# Patient Record
Sex: FEMALE | Race: WHITE | Employment: OTHER | URBAN - METROPOLITAN AREA
[De-identification: names, ages, dates, MRNs, and addresses within clinical notes are randomized per-mention and may not be internally consistent; named-entity substitution may affect disease eponyms.]

---

## 2019-11-15 RX ORDER — ALBUTEROL SULFATE 0.83 MG/ML
2.5 SOLUTION RESPIRATORY (INHALATION) EVERY 6 HOURS PRN
Qty: 1 EACH | Refills: 2 | Status: SHIPPED | OUTPATIENT
Start: 2019-11-15 | End: 2020-01-07

## 2019-11-15 NOTE — TELEPHONE ENCOUNTER
----- Message from Karon Costello sent at 11/15/2019  9:28 AM CST -----  Regarding: Refill  Contact: [phone]  Good Morning,      Requesting a return call back from Nurse regarding her mother in law's medicated inhaler, PROAIR ( Inhaler). Please contact caller at the number listed above.    Thank You,  [employer]

## 2020-01-07 ENCOUNTER — OFFICE VISIT (OUTPATIENT)
Dept: FAMILY MEDICINE | Facility: CLINIC | Age: 85
End: 2020-01-07
Payer: MEDICARE

## 2020-01-07 VITALS
WEIGHT: 166.69 LBS | BODY MASS INDEX: 28.46 KG/M2 | TEMPERATURE: 98 F | HEART RATE: 64 BPM | OXYGEN SATURATION: 97 % | DIASTOLIC BLOOD PRESSURE: 74 MMHG | HEIGHT: 64 IN | SYSTOLIC BLOOD PRESSURE: 122 MMHG

## 2020-01-07 DIAGNOSIS — M85.80 OSTEOPENIA, UNSPECIFIED LOCATION: Chronic | ICD-10-CM

## 2020-01-07 DIAGNOSIS — J44.9 MILD CHRONIC OBSTRUCTIVE PULMONARY DISEASE: ICD-10-CM

## 2020-01-07 DIAGNOSIS — I10 ESSENTIAL HYPERTENSION: ICD-10-CM

## 2020-01-07 DIAGNOSIS — E03.9 ACQUIRED HYPOTHYROIDISM: ICD-10-CM

## 2020-01-07 DIAGNOSIS — E78.2 MIXED HYPERLIPIDEMIA: ICD-10-CM

## 2020-01-07 DIAGNOSIS — N32.81 OVERACTIVE BLADDER: ICD-10-CM

## 2020-01-07 PROBLEM — M81.0 SENILE OSTEOPOROSIS: Status: RESOLVED | Noted: 2020-01-07 | Resolved: 2020-01-07

## 2020-01-07 PROBLEM — E78.00 HYPERCHOLESTEROLEMIA: Status: ACTIVE | Noted: 2020-01-07

## 2020-01-07 PROBLEM — M81.0 SENILE OSTEOPOROSIS: Status: ACTIVE | Noted: 2020-01-07

## 2020-01-07 PROCEDURE — 99214 OFFICE O/P EST MOD 30 MIN: CPT | Mod: S$PBB,,, | Performed by: INTERNAL MEDICINE

## 2020-01-07 PROCEDURE — 99213 OFFICE O/P EST LOW 20 MIN: CPT | Mod: PBBFAC,PN,25 | Performed by: INTERNAL MEDICINE

## 2020-01-07 PROCEDURE — 1126F AMNT PAIN NOTED NONE PRSNT: CPT | Mod: ,,, | Performed by: INTERNAL MEDICINE

## 2020-01-07 PROCEDURE — 1126F PR PAIN SEVERITY QUANTIFIED, NO PAIN PRESENT: ICD-10-PCS | Mod: ,,, | Performed by: INTERNAL MEDICINE

## 2020-01-07 PROCEDURE — G0009 ADMIN PNEUMOCOCCAL VACCINE: HCPCS | Mod: PBBFAC,PN

## 2020-01-07 PROCEDURE — 99214 PR OFFICE/OUTPT VISIT, EST, LEVL IV, 30-39 MIN: ICD-10-PCS | Mod: S$PBB,,, | Performed by: INTERNAL MEDICINE

## 2020-01-07 PROCEDURE — 99999 PR PBB SHADOW E&M-EST. PATIENT-LVL III: ICD-10-PCS | Mod: PBBFAC,,, | Performed by: INTERNAL MEDICINE

## 2020-01-07 PROCEDURE — 1159F MED LIST DOCD IN RCRD: CPT | Mod: ,,, | Performed by: INTERNAL MEDICINE

## 2020-01-07 PROCEDURE — 99999 PR PBB SHADOW E&M-EST. PATIENT-LVL III: CPT | Mod: PBBFAC,,, | Performed by: INTERNAL MEDICINE

## 2020-01-07 PROCEDURE — 1159F PR MEDICATION LIST DOCUMENTED IN MEDICAL RECORD: ICD-10-PCS | Mod: ,,, | Performed by: INTERNAL MEDICINE

## 2020-01-07 RX ORDER — HYDROCHLOROTHIAZIDE 25 MG/1
81 TABLET ORAL DAILY
Refills: 3 | COMMUNITY
Start: 2019-10-21 | End: 2020-07-10 | Stop reason: SDUPTHER

## 2020-01-07 RX ORDER — ALBUTEROL SULFATE 90 UG/1
2 AEROSOL, METERED RESPIRATORY (INHALATION) EVERY 6 HOURS PRN
Qty: 18 G | Refills: 5 | Status: SHIPPED | OUTPATIENT
Start: 2020-01-07 | End: 2021-07-09 | Stop reason: SDUPTHER

## 2020-01-07 RX ORDER — EPINEPHRINE 0.22MG
100 AEROSOL WITH ADAPTER (ML) INHALATION DAILY
COMMUNITY
Start: 2013-03-30

## 2020-01-07 RX ORDER — MULTIVIT WITH IRON,MINERALS
100 TABLET ORAL DAILY
COMMUNITY
Start: 2013-03-30 | End: 2020-07-10

## 2020-01-07 RX ORDER — OXYBUTYNIN CHLORIDE 5 MG/1
5 TABLET, EXTENDED RELEASE ORAL DAILY
COMMUNITY
Start: 2019-12-05 | End: 2020-05-29 | Stop reason: SDUPTHER

## 2020-01-07 RX ORDER — LEVOTHYROXINE SODIUM 112 UG/1
112 TABLET ORAL DAILY
COMMUNITY
Start: 2019-12-23 | End: 2020-06-22 | Stop reason: SDUPTHER

## 2020-01-07 NOTE — ASSESSMENT & PLAN NOTE
Stable with albuterol inhaler and nebs PRN.  Does well.  No SOB/wheezing.  Uses inhaler in AM usually, occasionally in the afternoon but not every day.

## 2020-01-07 NOTE — ASSESSMENT & PLAN NOTE
BP controlled on HCTZ 25 mg daily, no SOB/CP/HA.  Goes to wellness center 3 days per weeks.  Has occasional swelling in feet.

## 2020-01-07 NOTE — PROGRESS NOTES
[facility] Note    Chief Complaint      Chief Complaint   Patient presents with    Establish Care     f/u from       History of Present Illness      Pradip Zelaya is a 89 y.o. female who presents today to establish care for HTN.  Patient comes to appointment alone.     Problem List Items Addressed This Visit     Hypothyroidism    Current Assessment & Plan     Stable on synthroid 112 mcg daily, no S/S of hypo/hyperthyroidism.         Relevant Orders    TSH    T4, free    Mild chronic obstructive pulmonary disease    Current Assessment & Plan     Stable with albuterol inhaler and nebs PRN.  Does well.  No SOB/wheezing.  Uses inhaler in AM usually, occasionally in the afternoon but not every day.         Relevant Medications    albuterol (PROAIR HFA) 90 mcg/actuation inhaler    Other Relevant Orders    Pneumococcal Polysaccharide Vaccine (23 Valent) (SQ/IM)    Mixed hyperlipidemia    Current Assessment & Plan     Not on Rx meds, takes niacin.           Relevant Orders    Lipid panel    Overactive bladder    Current Assessment & Plan     Stable on ditropan 5 mg daily.  Works well for her.         Essential hypertension    Current Assessment & Plan     BP controlled on HCTZ 25 mg daily, no SOB/CP/HA.  Goes to wellness center 3 days per weeks.  Has occasional swelling in feet.         Relevant Orders    CBC auto differential    Comprehensive metabolic panel    RESOLVED: Senile osteoporosis    Current Assessment & Plan     On 7/2019 DEXA at . Exercises several times.               Health Maintenance   Topic Date Due    Lipid Panel  05/10/1927    TETANUS VACCINE  05/10/1945    Pneumococcal Vaccine (65+ Low/Medium Risk) (1 of 2 - PCV13) 05/10/1992       Past Medical History:   Diagnosis Date    Senile osteoporosis 1/7/2020       Past Surgical History:   Procedure Laterality Date    EYE SURGERY      TONSILLECTOMY         family history includes Cancer in her son; Hypertension in her  father; No Known Problems in her mother.    Social History     Tobacco Use    Smoking status: Never Smoker   Substance Use Topics    Alcohol use: Not Currently    Drug use: Never       Review of Systems   Constitutional: Negative for chills and fever.   HENT: Negative for congestion and sore throat.    Eyes: Negative for blurred vision and discharge.   Respiratory: Negative for cough and shortness of breath.    Cardiovascular: Negative for chest pain and palpitations.   Gastrointestinal: Negative for constipation, diarrhea, nausea and vomiting.   Genitourinary: Negative for dysuria and hematuria.   Musculoskeletal: Negative for falls and myalgias.   Skin: Negative for itching and rash.   Neurological: Negative for dizziness and headaches.        Outpatient Encounter Medications as of 1/7/2020   Medication Sig Dispense Refill    aspirin-calcium carbonate 81 mg-300 mg calcium(777 mg) Tab Take 81 mg by mouth once daily.      coenzyme Q10 100 mg capsule Take 100 mg by mouth once daily.      hydroCHLOROthiazide (HYDRODIURIL) 25 MG tablet Take 81 mg by mouth once daily.  3    levothyroxine (SYNTHROID) 112 MCG tablet Take 112 mcg by mouth once daily.      niacin 100 MG Tab Take 100 mg by mouth once daily.      oxybutynin (DITROPAN-XL) 5 MG TR24 Take 5 mg by mouth once daily.      [DISCONTINUED] albuterol (PROVENTIL) 2.5 mg /3 mL (0.083 %) nebulizer solution Take 3 mLs (2.5 mg total) by nebulization every 6 (six) hours as needed for Wheezing. Rescue 1 each 2    albuterol (PROAIR HFA) 90 mcg/actuation inhaler Inhale 2 puffs into the lungs every 6 (six) hours as needed for Wheezing. Rescue 18 g 5     No facility-administered encounter medications on file as of 1/7/2020.         Review of patient's allergies indicates:   Allergen Reactions    Bactrim [sulfamethoxazole-trimethoprim]     Ciprofloxacin        Physical Exam      Vital Signs  Temp: 97.7 °F (36.5 °C)  Temp src: Oral  Pulse: 64  SpO2: 97 %  BP:  "122/74  BP Location: Left arm  Patient Position: Sitting  Pain Score: 0-No pain  Height and Weight  Height: 5' 4" (162.6 cm)  Weight: 75.6 kg (166 lb 10.7 oz)  BSA (Calculated - sq m): 1.85 sq meters  BMI (Calculated): 28.6  Weight in (lb) to have BMI = 25: 145.3]    Physical Exam   Constitutional: She is oriented to person, place, and time. She appears well-developed and well-nourished.   HENT:   Head: Normocephalic and atraumatic.   Right Ear: External ear normal.   Left Ear: External ear normal.   Eyes: Right eye exhibits no discharge. Left eye exhibits no discharge.   Neck: Normal range of motion. No thyromegaly present.   Cardiovascular: Normal rate, regular rhythm, normal heart sounds and intact distal pulses.   No murmur heard.  Pulmonary/Chest: Effort normal and breath sounds normal. No respiratory distress.   Abdominal: Soft. Bowel sounds are normal. She exhibits no distension. There is no tenderness.   Musculoskeletal: Normal range of motion. She exhibits no deformity.   Neurological: She is alert and oriented to person, place, and time.   Skin: Skin is warm and dry. No rash noted.   Psychiatric: She has a normal mood and affect. Her behavior is normal.        Laboratory:  CBC:  No results for input(s): WBC, RBC, HGB, HCT, PLT, MCV, MCH, MCHC in the last 2160 hours.  CMP:  No results for input(s): GLU, CALCIUM, ALBUMIN, PROT, NA, K, CO2, CL, BUN, ALKPHOS, ALT, AST, BILITOT in the last 2160 hours.    Invalid input(s): CREATININ  URINALYSIS:  No results for input(s): COLORU, CLARITYU, SPECGRAV, PHUR, PROTEINUA, GLUCOSEU, BILIRUBINCON, BLOODU, WBCU, RBCU, BACTERIA, MUCUS, NITRITE, LEUKOCYTESUR, UROBILINOGEN, HYALINECASTS in the last 2160 hours.   LIPIDS:  No results for input(s): TSH, HDL, CHOL, TRIG, LDLCALC, CHOLHDL, NONHDLCHOL, TOTALCHOLEST in the last 2160 hours.  TSH:  No results for input(s): TSH in the last 2160 hours.  A1C:  No results for input(s): HGBA1C in the last 2160 hours.    Radiology:  No " imaging on file    Assessment/Plan     Pradip Zelaya is a 89 y.o.female with:    1. Mild chronic obstructive pulmonary disease  - albuterol (PROAIR HFA) 90 mcg/actuation inhaler; Inhale 2 puffs into the lungs every 6 (six) hours as needed for Wheezing. Rescue  Dispense: 18 g; Refill: 5  - Pneumococcal Polysaccharide Vaccine (23 Valent) (SQ/IM)    2. Mixed hyperlipidemia  - Lipid panel; Future  - Lipid panel    3. Overactive bladder    4. Acquired hypothyroidism  - TSH; Future  - T4, free; Future  - TSH  - T4, free    5. Essential hypertension  - CBC auto differential; Future  - Comprehensive metabolic panel; Future  - CBC auto differential  - Comprehensive metabolic panel    6. Osteopenia, unspecified location    -pneumovax today  -labs sent to [facility] per patient request  -Continue current medications and maintain follow up with specialists.  Return to clinic in 6 months.      Herminia Patiño MD  [facility] [place]

## 2020-01-10 LAB
ALBUMIN: 4.6 GRAM/DL (ref 3.5–5)
ALP SERPL-CCNC: 73 UNIT/L (ref 38–126)
ALT SERPL W P-5'-P-CCNC: 20 UNIT/L (ref 7–56)
ANION GAP SERPL CALC-SCNC: 19 MEQ/L (ref 9–18)
AST SERPL-CCNC: 26 UNIT/L (ref 7–40)
BASOPHILS ABSOLUTE COUNT: 0.1 K/UL (ref 0–0.2)
BASOPHILS NFR BLD: 1.2 % (ref 0–2)
BILIRUB SERPL-MCNC: 0.7 MG/DL (ref 0–1.2)
BUN BLD-MCNC: 21 MG/DL (ref 7–21)
BUN/CREAT SERPL: 23 RATIO (ref 6–22)
CALC OSMOLALITY: 288 MOSM/KG (ref 275–295)
CALCIUM SERPL-MCNC: 9.5 MG/DL (ref 8.5–10.3)
CHLORIDE SERPL-SCNC: 98 MEQ/L (ref 98–107)
CHOL/HDLC RATIO: 4
CHOLEST SERPL-MSCNC: 236 MG/DL (ref 100–200)
CO2 SERPL-SCNC: 29 MEQ/L (ref 21–31)
CREAT SERPL-MCNC: 0.9 MG/DL (ref 0.5–1)
DIFFERENTIAL TYPE: NORMAL
EOSINOPHIL NFR BLD: 2.4 % (ref 0–4)
EOSINOPHILS ABSOLUTE COUNT: 0.2 K/UL (ref 0–0.7)
ERYTHROCYTE [DISTWIDTH] IN BLOOD BY AUTOMATED COUNT: 13.9 GRAM/DL (ref 12–15.3)
FREE T4: 1.4 NANOGRAM/DL (ref 0.9–1.7)
GFR: 60.9 ML/MIN/1.73M2
GLUCOSE SERPL-MCNC: 130 MG/DL (ref 70–100)
HCT VFR BLD AUTO: 43.6 % (ref 37–47)
HDLC SERPL-MCNC: 64 MG/DL (ref 40–75)
HGB BLD-MCNC: 14.5 GRAM/DL (ref 12–16)
LDLC SERPL CALC-MCNC: 149 MG/DL (ref 0–125)
LYMPHOCYTES %: 40.2 % (ref 15–45)
LYMPHOCYTES ABSOLUTE COUNT: 2.5 K/UL (ref 1–4.2)
MCH RBC QN AUTO: 30.4 PICOGRAM (ref 27–33)
MCHC RBC AUTO-ENTMCNC: 33.3 GRAM/DL (ref 32–36)
MCV RBC AUTO: 91.4 FEMTOLITER (ref 81–99)
MONOCYTES %: 7.6 % (ref 3–13)
MONOCYTES ABSOLUTE COUNT: 0.5 K/UL (ref 0.1–0.8)
NEUTROPHILS ABSOLUTE COUNT: 3.1 K/UL (ref 2.1–7.6)
NEUTROPHILS RELATIVE PERCENT: 48.6 % (ref 32–80)
NONHDLC SERPL-MCNC: 172 MG/DL (ref 60–125)
PLATELET # BLD AUTO: 209 K/UL (ref 150–350)
PMV BLD AUTO: 8.3 FEMTOLITER (ref 7–10.2)
POTASSIUM SERPL-SCNC: 4 MEQ/L (ref 3.5–5)
RBC # BLD AUTO: 4.77 MIL/UL (ref 4.2–5.4)
SODIUM BLD-SCNC: 142 MEQ/L (ref 135–145)
TOTAL PROTEIN: 6.6 GRAM/DL (ref 6.3–8.2)
TRIGL SERPL-MCNC: 144 MG/DL (ref 30–150)
TSH SERPL DL<=0.005 MIU/L-ACNC: 3.06 UIL/ML (ref 0.35–4)
WBC # BLD AUTO: 6.3 K/UL (ref 4.5–11)

## 2020-01-13 NOTE — PROGRESS NOTES
Please let patient know that their cholesterol is high.  Given lack of other risk factors for stroke/heart attack, no need for medication at this time.  Patient just needs to work on low fat/healthy diet.    Fasting glucose is elevated, diet/exercise will also help with this.

## 2020-03-20 ENCOUNTER — PATIENT MESSAGE (OUTPATIENT)
Dept: FAMILY MEDICINE | Facility: CLINIC | Age: 85
End: 2020-03-20

## 2020-03-20 RX ORDER — METRONIDAZOLE 500 MG/1
500 TABLET ORAL 3 TIMES DAILY
Qty: 21 TABLET | Refills: 0 | Status: SHIPPED | OUTPATIENT
Start: 2020-03-20 | End: 2020-05-01 | Stop reason: SDUPTHER

## 2020-05-01 ENCOUNTER — PATIENT MESSAGE (OUTPATIENT)
Dept: FAMILY MEDICINE | Facility: CLINIC | Age: 85
End: 2020-05-01

## 2020-05-01 RX ORDER — METRONIDAZOLE 500 MG/1
500 TABLET ORAL 3 TIMES DAILY
Qty: 21 TABLET | Refills: 0 | Status: SHIPPED | OUTPATIENT
Start: 2020-05-01 | End: 2020-07-10

## 2020-05-29 ENCOUNTER — PATIENT MESSAGE (OUTPATIENT)
Dept: FAMILY MEDICINE | Facility: CLINIC | Age: 85
End: 2020-05-29

## 2020-05-29 RX ORDER — OXYBUTYNIN CHLORIDE 5 MG/1
5 TABLET, EXTENDED RELEASE ORAL DAILY
Qty: 90 TABLET | Refills: 3 | Status: SHIPPED | OUTPATIENT
Start: 2020-05-29 | End: 2020-07-10 | Stop reason: SDUPTHER

## 2020-07-10 ENCOUNTER — OFFICE VISIT (OUTPATIENT)
Dept: FAMILY MEDICINE | Facility: CLINIC | Age: 85
End: 2020-07-10
Payer: MEDICARE

## 2020-07-10 VITALS
OXYGEN SATURATION: 96 % | DIASTOLIC BLOOD PRESSURE: 82 MMHG | TEMPERATURE: 98 F | HEART RATE: 55 BPM | SYSTOLIC BLOOD PRESSURE: 124 MMHG | BODY MASS INDEX: 27.36 KG/M2 | WEIGHT: 159.38 LBS

## 2020-07-10 DIAGNOSIS — R73.01 IMPAIRED FASTING GLUCOSE: ICD-10-CM

## 2020-07-10 DIAGNOSIS — E78.2 MIXED HYPERLIPIDEMIA: ICD-10-CM

## 2020-07-10 DIAGNOSIS — E03.9 ACQUIRED HYPOTHYROIDISM: Primary | ICD-10-CM

## 2020-07-10 DIAGNOSIS — J44.9 MILD CHRONIC OBSTRUCTIVE PULMONARY DISEASE: ICD-10-CM

## 2020-07-10 DIAGNOSIS — M85.80 OSTEOPENIA, UNSPECIFIED LOCATION: ICD-10-CM

## 2020-07-10 DIAGNOSIS — I10 ESSENTIAL HYPERTENSION: ICD-10-CM

## 2020-07-10 DIAGNOSIS — N32.81 OVERACTIVE BLADDER: ICD-10-CM

## 2020-07-10 PROCEDURE — 99213 OFFICE O/P EST LOW 20 MIN: CPT | Mod: PBBFAC,PN | Performed by: INTERNAL MEDICINE

## 2020-07-10 PROCEDURE — 99999 PR PBB SHADOW E&M-EST. PATIENT-LVL III: CPT | Mod: PBBFAC,,, | Performed by: INTERNAL MEDICINE

## 2020-07-10 PROCEDURE — 99214 PR OFFICE/OUTPT VISIT, EST, LEVL IV, 30-39 MIN: ICD-10-PCS | Mod: S$PBB,,, | Performed by: INTERNAL MEDICINE

## 2020-07-10 PROCEDURE — 99999 PR PBB SHADOW E&M-EST. PATIENT-LVL III: ICD-10-PCS | Mod: PBBFAC,,, | Performed by: INTERNAL MEDICINE

## 2020-07-10 PROCEDURE — 99214 OFFICE O/P EST MOD 30 MIN: CPT | Mod: S$PBB,,, | Performed by: INTERNAL MEDICINE

## 2020-07-10 RX ORDER — GLUCOSAMINE/CHONDRO SU A 500-400 MG
1 TABLET ORAL DAILY
COMMUNITY

## 2020-07-10 RX ORDER — OXYBUTYNIN CHLORIDE 5 MG/1
5 TABLET, EXTENDED RELEASE ORAL DAILY
Qty: 90 TABLET | Refills: 3 | Status: SHIPPED | OUTPATIENT
Start: 2020-07-10 | End: 2020-07-10

## 2020-07-10 RX ORDER — FLUTICASONE FUROATE AND VILANTEROL TRIFENATATE 100; 25 UG/1; UG/1
1 POWDER RESPIRATORY (INHALATION) DAILY
Qty: 60 EACH | Refills: 5 | Status: SHIPPED | OUTPATIENT
Start: 2020-07-10 | End: 2021-01-08 | Stop reason: SDUPTHER

## 2020-07-10 RX ORDER — NIACIN 500 MG
250 CAPSULE, EXTENDED RELEASE ORAL NIGHTLY
COMMUNITY

## 2020-07-10 RX ORDER — LEVOTHYROXINE SODIUM 112 UG/1
112 TABLET ORAL DAILY
Qty: 90 TABLET | Refills: 3 | Status: SHIPPED | OUTPATIENT
Start: 2020-07-10 | End: 2021-01-11

## 2020-07-10 RX ORDER — HYDROCHLOROTHIAZIDE 25 MG/1
25 TABLET ORAL DAILY
Qty: 90 TABLET | Refills: 3 | Status: SHIPPED | OUTPATIENT
Start: 2020-07-10 | End: 2020-07-10

## 2020-07-10 RX ORDER — FLUTICASONE FUROATE AND VILANTEROL TRIFENATATE 100; 25 UG/1; UG/1
1 POWDER RESPIRATORY (INHALATION) DAILY
COMMUNITY
End: 2020-07-10 | Stop reason: SDUPTHER

## 2020-07-10 RX ORDER — LEVOTHYROXINE SODIUM 112 UG/1
112 TABLET ORAL DAILY
Qty: 90 TABLET | Refills: 3 | Status: SHIPPED | OUTPATIENT
Start: 2020-07-10 | End: 2020-07-10

## 2020-07-10 RX ORDER — ALENDRONATE SODIUM 70 MG/1
70 TABLET ORAL
Qty: 12 TABLET | Refills: 3 | Status: SHIPPED | OUTPATIENT
Start: 2020-07-10 | End: 2020-07-10

## 2020-07-10 RX ORDER — ASCORBIC ACID 500 MG
500 TABLET ORAL DAILY
COMMUNITY

## 2020-07-10 RX ORDER — ALENDRONATE SODIUM 70 MG/1
70 TABLET ORAL
Qty: 12 TABLET | Refills: 3 | Status: SHIPPED | OUTPATIENT
Start: 2020-07-10 | End: 2021-01-08

## 2020-07-10 RX ORDER — OXYBUTYNIN CHLORIDE 5 MG/1
5 TABLET, EXTENDED RELEASE ORAL DAILY
Qty: 90 TABLET | Refills: 3 | Status: SHIPPED | OUTPATIENT
Start: 2020-07-10 | End: 2020-08-26 | Stop reason: SDUPTHER

## 2020-07-10 RX ORDER — ALENDRONATE SODIUM 70 MG/1
70 TABLET ORAL
COMMUNITY
End: 2020-07-10 | Stop reason: SDUPTHER

## 2020-07-10 RX ORDER — HYDROCHLOROTHIAZIDE 25 MG/1
25 TABLET ORAL DAILY
Qty: 90 TABLET | Refills: 3 | Status: SHIPPED | OUTPATIENT
Start: 2020-07-10 | End: 2021-06-10 | Stop reason: SDUPTHER

## 2020-07-10 NOTE — PROGRESS NOTES
[facility] Note    Chief Complaint      Chief Complaint   Patient presents with    Follow-up     History of Present Illness      Pradip Zelaya is a 89 y.o. female who presents today for follow up of HTN.  Patient comes to appointment alone.     Problem List Items Addressed This Visit     Hypothyroidism - Primary    Current Assessment & Plan     Stable on synthroid 112 mcg daily, no S/S of hypo/hyperthyroidism.         Relevant Medications    levothyroxine (SYNTHROID) 112 MCG tablet    Other Relevant Orders    TSH    T4, free    Mild chronic obstructive pulmonary disease    Current Assessment & Plan     Stable with albuterol inhaler and nebs PRN.  Does well.  No SOB/wheezing.  Uses inhaler in AM usually, occasionally in the afternoon but not every day.         Relevant Medications    fluticasone furoate-vilanteroL (BREO ELLIPTA) 100-25 mcg/dose diskus inhaler    Mixed hyperlipidemia    Current Assessment & Plan     Not on Rx meds, takes niacin.           Relevant Orders    Lipid Panel    Overactive bladder    Relevant Medications    oxybutynin (DITROPAN-XL) 5 MG TR24    Essential hypertension    Current Assessment & Plan     BP controlled on HCTZ 25 mg daily, no SOB/CP/HA.  Goes to wellness center 3 days per weeks.  Has occasional swelling in feet.         Relevant Medications    hydroCHLOROthiazide (HYDRODIURIL) 25 MG tablet      Other Visit Diagnoses     Impaired fasting glucose        Relevant Orders    Hemoglobin A1C    Osteopenia, unspecified location        Relevant Medications    alendronate (FOSAMAX) 70 MG tablet          Health Maintenance   Topic Date Due    TETANUS VACCINE  05/10/1945    Lipid Panel  01/10/2025    Pneumococcal Vaccine (65+ Low/Medium Risk)  Completed       Past Medical History:   Diagnosis Date    Senile osteoporosis 1/7/2020       Past Surgical History:   Procedure Laterality Date    ADENOIDECTOMY  1934    EYE SURGERY      TONSILLECTOMY         family  history includes Cancer in her son; Heart disease in her mother; Hypertension in her father; Stroke in her father.    Social History     Tobacco Use    Smoking status: Former Smoker     Packs/day: 0.25     Years: 10.00     Pack years: 2.50     Types: Cigarettes     Start date: 1943     Quit date: 1953     Years since quittin.2    Tobacco comment: Extremely light usage.   Substance Use Topics    Alcohol use: Not Currently     Alcohol/week: 0.0 standard drinks     Frequency: Monthly or less     Drinks per session: Patient refused     Binge frequency: Never    Drug use: Never       Review of Systems   Constitutional: Negative for chills and fever.   HENT: Positive for hearing loss. Negative for congestion and sore throat.    Eyes: Negative for blurred vision and discharge.   Respiratory: Negative for cough, shortness of breath and wheezing.    Cardiovascular: Negative for chest pain and palpitations.   Gastrointestinal: Negative for constipation, diarrhea, nausea and vomiting.   Genitourinary: Negative for dysuria and hematuria.   Musculoskeletal: Negative for falls, myalgias and neck pain.   Skin: Negative for itching and rash.   Neurological: Negative for dizziness and headaches.        Outpatient Encounter Medications as of 7/10/2020   Medication Sig Dispense Refill    albuterol (PROAIR HFA) 90 mcg/actuation inhaler Inhale 2 puffs into the lungs every 6 (six) hours as needed for Wheezing. Rescue 18 g 5    alendronate (FOSAMAX) 70 MG tablet Take 1 tablet (70 mg total) by mouth every 7 days. 12 tablet 3    ascorbic acid, vitamin C, (VITAMIN C) 500 MG tablet Take 500 mg by mouth once daily.      aspirin-calcium carbonate 81 mg-300 mg calcium(777 mg) Tab Take 81 mg by mouth once daily.      coenzyme Q10 100 mg capsule Take 100 mg by mouth once daily.      fluticasone furoate-vilanteroL (BREO ELLIPTA) 100-25 mcg/dose diskus inhaler Inhale 1 puff into the lungs once daily. Controller 60 each 5     folic acid/multivit-min/lutein (CENTRUM SILVER ORAL) Take 1 capsule by mouth once daily.      glucosamine-chondroitin 500-400 mg tablet Take 1 tablet by mouth once daily.      hydroCHLOROthiazide (HYDRODIURIL) 25 MG tablet Take 1 tablet (25 mg total) by mouth once daily. 90 tablet 3    levothyroxine (SYNTHROID) 112 MCG tablet Take 1 tablet (112 mcg total) by mouth once daily. 90 tablet 3    niacin 500 MG CpSR Take 250 mg by mouth every evening.      oxybutynin (DITROPAN-XL) 5 MG TR24 Take 1 tablet (5 mg total) by mouth once daily. 90 tablet 3    [DISCONTINUED] alendronate (FOSAMAX) 70 MG tablet Take 70 mg by mouth every 7 days.      [DISCONTINUED] alendronate (FOSAMAX) 70 MG tablet Take 1 tablet (70 mg total) by mouth every 7 days. 12 tablet 3    [DISCONTINUED] fluticasone furoate-vilanteroL (BREO ELLIPTA) 100-25 mcg/dose diskus inhaler Inhale 1 puff into the lungs once daily. Controller      [DISCONTINUED] hydroCHLOROthiazide (HYDRODIURIL) 25 MG tablet Take 81 mg by mouth once daily.  3    [DISCONTINUED] hydroCHLOROthiazide (HYDRODIURIL) 25 MG tablet Take 1 tablet (25 mg total) by mouth once daily. 90 tablet 3    [DISCONTINUED] levothyroxine (SYNTHROID) 112 MCG tablet Take 1 tablet (112 mcg total) by mouth once daily. 30 tablet 5    [DISCONTINUED] levothyroxine (SYNTHROID) 112 MCG tablet Take 1 tablet (112 mcg total) by mouth once daily. 90 tablet 3    [DISCONTINUED] oxybutynin (DITROPAN-XL) 5 MG TR24 Take 1 tablet (5 mg total) by mouth once daily. 90 tablet 3    [DISCONTINUED] oxybutynin (DITROPAN-XL) 5 MG TR24 Take 1 tablet (5 mg total) by mouth once daily. 90 tablet 3    [DISCONTINUED] metroNIDAZOLE (FLAGYL) 500 MG tablet Take 1 tablet (500 mg total) by mouth 3 (three) times daily. (Patient not taking: Reported on 7/10/2020) 21 tablet 0    [DISCONTINUED] niacin 100 MG Tab Take 100 mg by mouth once daily.       No facility-administered encounter medications on file as of 7/10/2020.         Review  of patient's allergies indicates:   Allergen Reactions    Bactrim [sulfamethoxazole-trimethoprim]     Ciprofloxacin        Physical Exam      Vital Signs  Temp: 97.7 °F (36.5 °C)  Temp src: Oral  Pulse: (!) 55  SpO2: 96 %  BP: 124/82  BP Location: Left arm  Patient Position: Sitting  Pain Score: 0-No pain  Height and Weight  Weight: 72.3 kg (159 lb 6.3 oz)]  Body mass index is 27.36 kg/m².    Physical Exam  Constitutional:       Appearance: She is well-developed.   HENT:      Head: Normocephalic and atraumatic.      Right Ear: External ear normal.      Left Ear: External ear normal.   Eyes:      General:         Right eye: No discharge.         Left eye: No discharge.   Neck:      Musculoskeletal: Normal range of motion.      Thyroid: No thyromegaly.   Cardiovascular:      Rate and Rhythm: Normal rate and regular rhythm.      Heart sounds: Normal heart sounds. No murmur.   Pulmonary:      Effort: Pulmonary effort is normal. No respiratory distress.      Breath sounds: Normal breath sounds.   Abdominal:      General: Bowel sounds are normal. There is no distension.      Palpations: Abdomen is soft.      Tenderness: There is no abdominal tenderness.   Musculoskeletal: Normal range of motion.         General: No deformity.   Skin:     General: Skin is warm and dry.      Findings: No rash.   Neurological:      Mental Status: She is alert and oriented to person, place, and time.   Psychiatric:         Behavior: Behavior normal.          Laboratory:  CBC:  No results for input(s): WBC, RBC, HGB, HCT, PLT, MCV, MCH, MCHC in the last 2160 hours.  CMP:  No results for input(s): GLU, CALCIUM, ALBUMIN, PROT, NA, K, CO2, CL, BUN, ALKPHOS, ALT, AST, BILITOT in the last 2160 hours.    Invalid input(s): CREATININ  URINALYSIS:  No results for input(s): COLORU, CLARITYU, SPECGRAV, PHUR, PROTEINUA, GLUCOSEU, BILIRUBINCON, BLOODU, WBCU, RBCU, BACTERIA, MUCUS, NITRITE, LEUKOCYTESUR, UROBILINOGEN, HYALINECASTS in the last 2160 hours.    LIPIDS:  No results for input(s): TSH, HDL, CHOL, TRIG, LDLCALC, CHOLHDL, NONHDLCHOL, TOTALCHOLEST in the last 2160 hours.  TSH:  No results for input(s): TSH in the last 2160 hours.  A1C:  No results for input(s): HGBA1C in the last 2160 hours.    Radiology:  No imaging on file    Assessment/Plan     Pradip Zelaya is a 89 y.o.female with:    1. Acquired hypothyroidism  - TSH; Future  - T4, free; Future  - TSH  - T4, free  - levothyroxine (SYNTHROID) 112 MCG tablet; Take 1 tablet (112 mcg total) by mouth once daily.  Dispense: 90 tablet; Refill: 3    2. Mixed hyperlipidemia  - Lipid Panel; Future  - Lipid Panel    3. Essential hypertension  - hydroCHLOROthiazide (HYDRODIURIL) 25 MG tablet; Take 1 tablet (25 mg total) by mouth once daily.  Dispense: 90 tablet; Refill: 3    4. Mild chronic obstructive pulmonary disease  - fluticasone furoate-vilanteroL (BREO ELLIPTA) 100-25 mcg/dose diskus inhaler; Inhale 1 puff into the lungs once daily. Controller  Dispense: 60 each; Refill: 5    5. Impaired fasting glucose  - Hemoglobin A1C; Future  - Hemoglobin A1C    6. Osteopenia, unspecified location  - alendronate (FOSAMAX) 70 MG tablet; Take 1 tablet (70 mg total) by mouth every 7 days.  Dispense: 12 tablet; Refill: 3    7. Overactive bladder  - oxybutynin (DITROPAN-XL) 5 MG TR24; Take 1 tablet (5 mg total) by mouth once daily.  Dispense: 90 tablet; Refill: 3    -labs sent to [facility] per patient request  -Continue current medications and maintain follow up with specialists.  Return to clinic in 6 months.      Herminia Patiño MD  [facility] [place]      Answers for HPI/ROS submitted by the patient on 7/7/2020   activity change: No  unexpected weight change: No  trouble swallowing: No  chest tightness: No  difficulty urinating: No  dysphoric mood: No

## 2020-07-14 LAB
CHOLEST SERPL-MCNC: 232 MG/DL
CHOLEST/HDLC SERPL: 3.9 (CALC)
HBA1C MFR BLD: 6.4 % OF TOTAL HGB
HDLC SERPL-MCNC: 60 MG/DL
LDLC SERPL CALC-MCNC: 143 MG/DL (CALC)
NONHDLC SERPL-MCNC: 172 MG/DL (CALC)
T4 FREE SERPL-MCNC: 1.4 NG/DL (ref 0.8–1.8)
TRIGL SERPL-MCNC: 157 MG/DL
TSH SERPL-ACNC: 3.83 MIU/L (ref 0.4–4.5)

## 2020-08-26 DIAGNOSIS — N32.81 OVERACTIVE BLADDER: ICD-10-CM

## 2020-08-27 RX ORDER — OXYBUTYNIN CHLORIDE 5 MG/1
5 TABLET, EXTENDED RELEASE ORAL DAILY
Qty: 90 TABLET | Refills: 3 | Status: SHIPPED | OUTPATIENT
Start: 2020-08-27 | End: 2021-07-09 | Stop reason: SDUPTHER

## 2020-10-01 ENCOUNTER — PATIENT MESSAGE (OUTPATIENT)
Dept: OTHER | Facility: OTHER | Age: 85
End: 2020-10-01

## 2020-12-11 ENCOUNTER — PATIENT MESSAGE (OUTPATIENT)
Dept: OTHER | Facility: OTHER | Age: 85
End: 2020-12-11

## 2021-01-04 ENCOUNTER — PATIENT MESSAGE (OUTPATIENT)
Dept: FAMILY MEDICINE | Facility: CLINIC | Age: 86
End: 2021-01-04

## 2021-01-08 ENCOUNTER — OFFICE VISIT (OUTPATIENT)
Dept: FAMILY MEDICINE | Facility: CLINIC | Age: 86
End: 2021-01-08
Payer: MEDICARE

## 2021-01-08 VITALS
WEIGHT: 157.75 LBS | DIASTOLIC BLOOD PRESSURE: 74 MMHG | SYSTOLIC BLOOD PRESSURE: 132 MMHG | HEART RATE: 66 BPM | OXYGEN SATURATION: 96 % | BODY MASS INDEX: 27.08 KG/M2

## 2021-01-08 DIAGNOSIS — R73.01 IMPAIRED FASTING GLUCOSE: Primary | ICD-10-CM

## 2021-01-08 DIAGNOSIS — J44.9 MILD CHRONIC OBSTRUCTIVE PULMONARY DISEASE: ICD-10-CM

## 2021-01-08 DIAGNOSIS — I10 ESSENTIAL HYPERTENSION: ICD-10-CM

## 2021-01-08 DIAGNOSIS — E78.2 MIXED HYPERLIPIDEMIA: ICD-10-CM

## 2021-01-08 DIAGNOSIS — N32.81 OVERACTIVE BLADDER: ICD-10-CM

## 2021-01-08 DIAGNOSIS — E03.9 ACQUIRED HYPOTHYROIDISM: ICD-10-CM

## 2021-01-08 PROCEDURE — 99999 PR PBB SHADOW E&M-EST. PATIENT-LVL III: CPT | Mod: PBBFAC,,, | Performed by: INTERNAL MEDICINE

## 2021-01-08 PROCEDURE — 99214 OFFICE O/P EST MOD 30 MIN: CPT | Mod: S$PBB,,, | Performed by: INTERNAL MEDICINE

## 2021-01-08 PROCEDURE — 99213 OFFICE O/P EST LOW 20 MIN: CPT | Mod: PBBFAC,PN | Performed by: INTERNAL MEDICINE

## 2021-01-08 PROCEDURE — 99214 PR OFFICE/OUTPT VISIT, EST, LEVL IV, 30-39 MIN: ICD-10-PCS | Mod: S$PBB,,, | Performed by: INTERNAL MEDICINE

## 2021-01-08 PROCEDURE — 99999 PR PBB SHADOW E&M-EST. PATIENT-LVL III: ICD-10-PCS | Mod: PBBFAC,,, | Performed by: INTERNAL MEDICINE

## 2021-01-08 RX ORDER — FLUTICASONE FUROATE AND VILANTEROL TRIFENATATE 100; 25 UG/1; UG/1
1 POWDER RESPIRATORY (INHALATION) DAILY
Qty: 60 EACH | Refills: 5 | Status: SHIPPED | OUTPATIENT
Start: 2021-01-08 | End: 2021-07-09

## 2021-01-10 ENCOUNTER — IMMUNIZATION (OUTPATIENT)
Dept: INTERNAL MEDICINE | Facility: CLINIC | Age: 86
End: 2021-01-10
Payer: MEDICARE

## 2021-01-10 DIAGNOSIS — Z23 NEED FOR VACCINATION: ICD-10-CM

## 2021-01-10 PROCEDURE — 91300 COVID-19, MRNA, LNP-S, PF, 30 MCG/0.3 ML DOSE VACCINE: CPT | Mod: PBBFAC | Performed by: FAMILY MEDICINE

## 2021-01-11 ENCOUNTER — PATIENT MESSAGE (OUTPATIENT)
Dept: FAMILY MEDICINE | Facility: CLINIC | Age: 86
End: 2021-01-11

## 2021-01-11 DIAGNOSIS — E03.9 ACQUIRED HYPOTHYROIDISM: ICD-10-CM

## 2021-01-11 RX ORDER — LEVOTHYROXINE SODIUM 100 UG/1
100 TABLET ORAL DAILY
Qty: 90 TABLET | Refills: 3 | Status: SHIPPED | OUTPATIENT
Start: 2021-01-11 | End: 2021-09-29 | Stop reason: SDUPTHER

## 2021-01-31 ENCOUNTER — IMMUNIZATION (OUTPATIENT)
Dept: INTERNAL MEDICINE | Facility: CLINIC | Age: 86
End: 2021-01-31
Payer: MEDICARE

## 2021-01-31 DIAGNOSIS — Z23 NEED FOR VACCINATION: Primary | ICD-10-CM

## 2021-01-31 PROCEDURE — 91300 COVID-19, MRNA, LNP-S, PF, 30 MCG/0.3 ML DOSE VACCINE: CPT | Mod: PBBFAC | Performed by: FAMILY MEDICINE

## 2021-01-31 PROCEDURE — 0002A COVID-19, MRNA, LNP-S, PF, 30 MCG/0.3 ML DOSE VACCINE: CPT | Mod: PBBFAC | Performed by: FAMILY MEDICINE

## 2021-02-15 ENCOUNTER — PATIENT MESSAGE (OUTPATIENT)
Dept: FAMILY MEDICINE | Facility: CLINIC | Age: 86
End: 2021-02-15

## 2021-02-17 LAB
FREE T4: 1.5 NANOGRAM/DL (ref 0.9–1.7)
TSH SERPL DL<=0.005 MIU/L-ACNC: 2.56 UIL/ML (ref 0.35–4)

## 2021-03-23 ENCOUNTER — PATIENT MESSAGE (OUTPATIENT)
Dept: ADMINISTRATIVE | Facility: OTHER | Age: 86
End: 2021-03-23

## 2021-07-09 ENCOUNTER — OFFICE VISIT (OUTPATIENT)
Dept: FAMILY MEDICINE | Facility: CLINIC | Age: 86
End: 2021-07-09
Payer: MEDICARE

## 2021-07-09 VITALS
OXYGEN SATURATION: 96 % | BODY MASS INDEX: 26.54 KG/M2 | SYSTOLIC BLOOD PRESSURE: 106 MMHG | WEIGHT: 155.44 LBS | TEMPERATURE: 98 F | DIASTOLIC BLOOD PRESSURE: 60 MMHG | HEIGHT: 64 IN | HEART RATE: 58 BPM

## 2021-07-09 DIAGNOSIS — N32.81 OVERACTIVE BLADDER: ICD-10-CM

## 2021-07-09 DIAGNOSIS — E03.9 ACQUIRED HYPOTHYROIDISM: ICD-10-CM

## 2021-07-09 DIAGNOSIS — E78.2 MIXED HYPERLIPIDEMIA: ICD-10-CM

## 2021-07-09 DIAGNOSIS — I10 ESSENTIAL HYPERTENSION: ICD-10-CM

## 2021-07-09 DIAGNOSIS — N18.4 STAGE 4 CHRONIC KIDNEY DISEASE DUE TO HYPERTENSION: ICD-10-CM

## 2021-07-09 DIAGNOSIS — I70.0 ATHEROSCLEROSIS OF AORTA: ICD-10-CM

## 2021-07-09 DIAGNOSIS — J44.9 MILD CHRONIC OBSTRUCTIVE PULMONARY DISEASE: ICD-10-CM

## 2021-07-09 DIAGNOSIS — R73.03 PREDIABETES: ICD-10-CM

## 2021-07-09 DIAGNOSIS — I12.9 STAGE 4 CHRONIC KIDNEY DISEASE DUE TO HYPERTENSION: ICD-10-CM

## 2021-07-09 PROBLEM — N18.31 STAGE 3A CHRONIC KIDNEY DISEASE: Status: ACTIVE | Noted: 2021-07-09

## 2021-07-09 PROCEDURE — 99999 PR PBB SHADOW E&M-EST. PATIENT-LVL III: CPT | Mod: PBBFAC,,, | Performed by: INTERNAL MEDICINE

## 2021-07-09 PROCEDURE — 99213 OFFICE O/P EST LOW 20 MIN: CPT | Mod: PBBFAC,PN | Performed by: INTERNAL MEDICINE

## 2021-07-09 PROCEDURE — 99214 PR OFFICE/OUTPT VISIT, EST, LEVL IV, 30-39 MIN: ICD-10-PCS | Mod: S$PBB,,, | Performed by: INTERNAL MEDICINE

## 2021-07-09 PROCEDURE — 99999 PR PBB SHADOW E&M-EST. PATIENT-LVL III: ICD-10-PCS | Mod: PBBFAC,,, | Performed by: INTERNAL MEDICINE

## 2021-07-09 PROCEDURE — 99214 OFFICE O/P EST MOD 30 MIN: CPT | Mod: S$PBB,,, | Performed by: INTERNAL MEDICINE

## 2021-07-09 RX ORDER — OXYBUTYNIN CHLORIDE 5 MG/1
5 TABLET, EXTENDED RELEASE ORAL DAILY
Qty: 90 TABLET | Refills: 3 | Status: SHIPPED | OUTPATIENT
Start: 2021-07-09 | End: 2022-03-29 | Stop reason: SDUPTHER

## 2021-07-09 RX ORDER — ALBUTEROL SULFATE 90 UG/1
2 AEROSOL, METERED RESPIRATORY (INHALATION) EVERY 6 HOURS PRN
Qty: 18 G | Refills: 5 | Status: SHIPPED | OUTPATIENT
Start: 2021-07-09 | End: 2022-03-29 | Stop reason: SDUPTHER

## 2021-07-20 LAB
T4 FREE SERPL-MCNC: 1.4 NG/DL (ref 0.8–1.8)
TSH SERPL-ACNC: 2.72 MIU/L (ref 0.4–4.5)

## 2021-09-29 ENCOUNTER — PATIENT MESSAGE (OUTPATIENT)
Dept: FAMILY MEDICINE | Facility: CLINIC | Age: 86
End: 2021-09-29

## 2021-09-29 ENCOUNTER — TELEPHONE (OUTPATIENT)
Dept: FAMILY MEDICINE | Facility: CLINIC | Age: 86
End: 2021-09-29

## 2021-09-29 DIAGNOSIS — E03.9 ACQUIRED HYPOTHYROIDISM: ICD-10-CM

## 2021-09-29 RX ORDER — METHYLPREDNISOLONE 4 MG/1
TABLET ORAL
Qty: 1 PACKAGE | Refills: 0 | Status: SHIPPED | OUTPATIENT
Start: 2021-09-29 | End: 2021-09-29

## 2021-09-29 RX ORDER — METRONIDAZOLE 500 MG/1
500 TABLET ORAL 3 TIMES DAILY
Qty: 21 TABLET | Refills: 0 | Status: SHIPPED | OUTPATIENT
Start: 2021-09-29 | End: 2022-03-29

## 2021-09-29 RX ORDER — LEVOTHYROXINE SODIUM 100 UG/1
100 TABLET ORAL DAILY
Qty: 90 TABLET | Refills: 3 | Status: SHIPPED | OUTPATIENT
Start: 2021-09-29 | End: 2022-03-29 | Stop reason: SDUPTHER

## 2021-10-07 ENCOUNTER — IMMUNIZATION (OUTPATIENT)
Dept: INTERNAL MEDICINE | Facility: CLINIC | Age: 86
End: 2021-10-07
Payer: MEDICARE

## 2021-10-07 DIAGNOSIS — Z23 NEED FOR VACCINATION: Primary | ICD-10-CM

## 2021-10-07 PROCEDURE — 0003A COVID-19, MRNA, LNP-S, PF, 30 MCG/0.3 ML DOSE VACCINE: CPT | Mod: PBBFAC,PO

## 2021-10-07 PROCEDURE — 91300 COVID-19, MRNA, LNP-S, PF, 30 MCG/0.3 ML DOSE VACCINE: CPT | Mod: PBBFAC,PO

## 2021-12-03 ENCOUNTER — PATIENT MESSAGE (OUTPATIENT)
Dept: INTERNAL MEDICINE | Facility: CLINIC | Age: 86
End: 2021-12-03
Payer: MEDICARE

## 2021-12-03 DIAGNOSIS — I10 ESSENTIAL HYPERTENSION: ICD-10-CM

## 2021-12-05 RX ORDER — HYDROCHLOROTHIAZIDE 25 MG/1
25 TABLET ORAL DAILY
Qty: 90 TABLET | Refills: 3 | Status: SHIPPED | OUTPATIENT
Start: 2021-12-05 | End: 2022-03-29 | Stop reason: SDUPTHER

## 2022-01-03 ENCOUNTER — PATIENT MESSAGE (OUTPATIENT)
Dept: FAMILY MEDICINE | Facility: CLINIC | Age: 87
End: 2022-01-03
Payer: MEDICARE

## 2022-01-03 ENCOUNTER — TELEPHONE (OUTPATIENT)
Dept: FAMILY MEDICINE | Facility: CLINIC | Age: 87
End: 2022-01-03
Payer: MEDICARE

## 2022-01-04 ENCOUNTER — PATIENT MESSAGE (OUTPATIENT)
Dept: INTERNAL MEDICINE | Facility: CLINIC | Age: 87
End: 2022-01-04
Payer: MEDICARE

## 2022-01-04 ENCOUNTER — PATIENT MESSAGE (OUTPATIENT)
Dept: FAMILY MEDICINE | Facility: CLINIC | Age: 87
End: 2022-01-04
Payer: MEDICARE

## 2022-01-17 ENCOUNTER — PATIENT MESSAGE (OUTPATIENT)
Dept: INTERNAL MEDICINE | Facility: CLINIC | Age: 87
End: 2022-01-17
Payer: MEDICARE

## 2022-03-14 ENCOUNTER — PATIENT MESSAGE (OUTPATIENT)
Dept: INTERNAL MEDICINE | Facility: CLINIC | Age: 87
End: 2022-03-14
Payer: MEDICARE

## 2022-03-29 ENCOUNTER — OFFICE VISIT (OUTPATIENT)
Dept: INTERNAL MEDICINE | Facility: CLINIC | Age: 87
End: 2022-03-29
Payer: MEDICARE

## 2022-03-29 ENCOUNTER — LAB VISIT (OUTPATIENT)
Dept: LAB | Facility: HOSPITAL | Age: 87
End: 2022-03-29
Attending: INTERNAL MEDICINE
Payer: MEDICARE

## 2022-03-29 VITALS
HEIGHT: 64 IN | RESPIRATION RATE: 16 BRPM | OXYGEN SATURATION: 96 % | WEIGHT: 156.19 LBS | BODY MASS INDEX: 26.67 KG/M2 | SYSTOLIC BLOOD PRESSURE: 128 MMHG | TEMPERATURE: 98 F | HEART RATE: 64 BPM | DIASTOLIC BLOOD PRESSURE: 84 MMHG

## 2022-03-29 DIAGNOSIS — I10 ESSENTIAL HYPERTENSION: ICD-10-CM

## 2022-03-29 DIAGNOSIS — E03.9 ACQUIRED HYPOTHYROIDISM: ICD-10-CM

## 2022-03-29 DIAGNOSIS — R73.03 PREDIABETES: ICD-10-CM

## 2022-03-29 DIAGNOSIS — E78.2 MIXED HYPERLIPIDEMIA: ICD-10-CM

## 2022-03-29 DIAGNOSIS — N32.81 OVERACTIVE BLADDER: ICD-10-CM

## 2022-03-29 DIAGNOSIS — N18.32 HYPERTENSIVE KIDNEY DISEASE WITH STAGE 3B CHRONIC KIDNEY DISEASE: ICD-10-CM

## 2022-03-29 DIAGNOSIS — J44.9 MILD CHRONIC OBSTRUCTIVE PULMONARY DISEASE: ICD-10-CM

## 2022-03-29 DIAGNOSIS — I12.9 HYPERTENSIVE KIDNEY DISEASE WITH STAGE 3B CHRONIC KIDNEY DISEASE: ICD-10-CM

## 2022-03-29 DIAGNOSIS — I70.0 ATHEROSCLEROSIS OF AORTA: ICD-10-CM

## 2022-03-29 LAB
ALBUMIN SERPL BCP-MCNC: 4 G/DL (ref 3.5–5.2)
ALP SERPL-CCNC: 65 U/L (ref 55–135)
ALT SERPL W/O P-5'-P-CCNC: 18 U/L (ref 10–44)
ANION GAP SERPL CALC-SCNC: 12 MMOL/L (ref 8–16)
AST SERPL-CCNC: 27 U/L (ref 10–40)
BASOPHILS # BLD AUTO: 0.07 K/UL (ref 0–0.2)
BASOPHILS NFR BLD: 1.2 % (ref 0–1.9)
BILIRUB SERPL-MCNC: 1 MG/DL (ref 0.1–1)
BUN SERPL-MCNC: 21 MG/DL (ref 10–30)
CALCIUM SERPL-MCNC: 10.5 MG/DL (ref 8.7–10.5)
CHLORIDE SERPL-SCNC: 97 MMOL/L (ref 95–110)
CHOLEST SERPL-MCNC: 232 MG/DL (ref 120–199)
CHOLEST/HDLC SERPL: 4.1 {RATIO} (ref 2–5)
CO2 SERPL-SCNC: 30 MMOL/L (ref 23–29)
CREAT SERPL-MCNC: 0.9 MG/DL (ref 0.5–1.4)
DIFFERENTIAL METHOD: NORMAL
EOSINOPHIL # BLD AUTO: 0.2 K/UL (ref 0–0.5)
EOSINOPHIL NFR BLD: 3 % (ref 0–8)
ERYTHROCYTE [DISTWIDTH] IN BLOOD BY AUTOMATED COUNT: 13 % (ref 11.5–14.5)
EST. GFR  (AFRICAN AMERICAN): >60 ML/MIN/1.73 M^2
EST. GFR  (NON AFRICAN AMERICAN): 54.9 ML/MIN/1.73 M^2
ESTIMATED AVG GLUCOSE: 143 MG/DL (ref 68–131)
GLUCOSE SERPL-MCNC: 121 MG/DL (ref 70–110)
HBA1C MFR BLD: 6.6 % (ref 4–5.6)
HCT VFR BLD AUTO: 45.8 % (ref 37–48.5)
HDLC SERPL-MCNC: 57 MG/DL (ref 40–75)
HDLC SERPL: 24.6 % (ref 20–50)
HGB BLD-MCNC: 14.7 G/DL (ref 12–16)
IMM GRANULOCYTES # BLD AUTO: 0.01 K/UL (ref 0–0.04)
IMM GRANULOCYTES NFR BLD AUTO: 0.2 % (ref 0–0.5)
LDLC SERPL CALC-MCNC: 149.2 MG/DL (ref 63–159)
LYMPHOCYTES # BLD AUTO: 2.4 K/UL (ref 1–4.8)
LYMPHOCYTES NFR BLD: 39.5 % (ref 18–48)
MCH RBC QN AUTO: 30.4 PG (ref 27–31)
MCHC RBC AUTO-ENTMCNC: 32.1 G/DL (ref 32–36)
MCV RBC AUTO: 95 FL (ref 82–98)
MONOCYTES # BLD AUTO: 0.6 K/UL (ref 0.3–1)
MONOCYTES NFR BLD: 9.5 % (ref 4–15)
NEUTROPHILS # BLD AUTO: 2.8 K/UL (ref 1.8–7.7)
NEUTROPHILS NFR BLD: 46.6 % (ref 38–73)
NONHDLC SERPL-MCNC: 175 MG/DL
NRBC BLD-RTO: 0 /100 WBC
PLATELET # BLD AUTO: 225 K/UL (ref 150–450)
PMV BLD AUTO: 10 FL (ref 9.2–12.9)
POTASSIUM SERPL-SCNC: 4 MMOL/L (ref 3.5–5.1)
PROT SERPL-MCNC: 6.9 G/DL (ref 6–8.4)
RBC # BLD AUTO: 4.83 M/UL (ref 4–5.4)
SODIUM SERPL-SCNC: 139 MMOL/L (ref 136–145)
T4 FREE SERPL-MCNC: 1.27 NG/DL (ref 0.71–1.51)
TRIGL SERPL-MCNC: 129 MG/DL (ref 30–150)
TSH SERPL DL<=0.005 MIU/L-ACNC: 5.7 UIU/ML (ref 0.4–4)
WBC # BLD AUTO: 6.03 K/UL (ref 3.9–12.7)

## 2022-03-29 PROCEDURE — 80053 COMPREHEN METABOLIC PANEL: CPT | Performed by: INTERNAL MEDICINE

## 2022-03-29 PROCEDURE — 99213 OFFICE O/P EST LOW 20 MIN: CPT | Mod: PBBFAC | Performed by: INTERNAL MEDICINE

## 2022-03-29 PROCEDURE — 84443 ASSAY THYROID STIM HORMONE: CPT | Performed by: INTERNAL MEDICINE

## 2022-03-29 PROCEDURE — 83036 HEMOGLOBIN GLYCOSYLATED A1C: CPT | Performed by: INTERNAL MEDICINE

## 2022-03-29 PROCEDURE — 80061 LIPID PANEL: CPT | Performed by: INTERNAL MEDICINE

## 2022-03-29 PROCEDURE — 99214 PR OFFICE/OUTPT VISIT, EST, LEVL IV, 30-39 MIN: ICD-10-PCS | Mod: S$PBB,,, | Performed by: INTERNAL MEDICINE

## 2022-03-29 PROCEDURE — 36415 COLL VENOUS BLD VENIPUNCTURE: CPT | Performed by: INTERNAL MEDICINE

## 2022-03-29 PROCEDURE — 84439 ASSAY OF FREE THYROXINE: CPT | Performed by: INTERNAL MEDICINE

## 2022-03-29 PROCEDURE — 99999 PR PBB SHADOW E&M-EST. PATIENT-LVL III: CPT | Mod: PBBFAC,,, | Performed by: INTERNAL MEDICINE

## 2022-03-29 PROCEDURE — 99999 PR PBB SHADOW E&M-EST. PATIENT-LVL III: ICD-10-PCS | Mod: PBBFAC,,, | Performed by: INTERNAL MEDICINE

## 2022-03-29 PROCEDURE — 85025 COMPLETE CBC W/AUTO DIFF WBC: CPT | Performed by: INTERNAL MEDICINE

## 2022-03-29 PROCEDURE — 99214 OFFICE O/P EST MOD 30 MIN: CPT | Mod: S$PBB,,, | Performed by: INTERNAL MEDICINE

## 2022-03-29 RX ORDER — ALBUTEROL SULFATE 90 UG/1
2 AEROSOL, METERED RESPIRATORY (INHALATION) EVERY 6 HOURS PRN
Qty: 18 G | Refills: 5 | Status: SHIPPED | OUTPATIENT
Start: 2022-03-29 | End: 2023-05-01

## 2022-03-29 RX ORDER — HYDROCHLOROTHIAZIDE 25 MG/1
25 TABLET ORAL DAILY
Qty: 90 TABLET | Refills: 3 | Status: SHIPPED | OUTPATIENT
Start: 2022-03-29 | End: 2022-12-21

## 2022-03-29 RX ORDER — LEVOTHYROXINE SODIUM 100 UG/1
100 TABLET ORAL DAILY
Qty: 90 TABLET | Refills: 3 | Status: SHIPPED | OUTPATIENT
Start: 2022-03-29 | End: 2023-05-01

## 2022-03-29 RX ORDER — OXYBUTYNIN CHLORIDE 5 MG/1
5 TABLET, EXTENDED RELEASE ORAL DAILY
Qty: 90 TABLET | Refills: 3 | Status: SHIPPED | OUTPATIENT
Start: 2022-03-29 | End: 2023-09-22

## 2022-03-29 NOTE — ASSESSMENT & PLAN NOTE
Not on Rx meds, takes niacin.    The ASCVD Risk score (Tunnel Hillorion GONZALEZ Jr., et al., 2013) failed to calculate for the following reasons:    The 2013 ASCVD risk score is only valid for ages 40 to 79

## 2022-03-29 NOTE — PROGRESS NOTES
[facility]    Chief Complaint      Chief Complaint   Patient presents with    Follow-up     History of Present Illness      Pradip Zelaya is a 89 y.o. female who presents today for follow up of HTN.  Patient comes to appointment with child.    Had to move out of house after Genna, went to [place] for 5 weeks.  Back in her house now.  Sleeps ok most nights, some times wakes up and can't go back to sleep.    Problem List Items Addressed This Visit     Hypothyroidism    Current Assessment & Plan     Stable on synthroid 112 mcg daily, no S/S of hypo/hyperthyroidism.           Relevant Medications    levothyroxine (SYNTHROID) 100 MCG tablet    Other Relevant Orders    TSH    T4, Free    Mild chronic obstructive pulmonary disease    Current Assessment & Plan     Stable with albuterol inhaler and nebs PRN.  Stopped Breo 2/2 hoarsness.  Does well.  No SOB/wheezing.  Uses inhaler in AM usually, occasionally in the afternoon but not every day.           Relevant Medications    albuterol (PROAIR HFA) 90 mcg/actuation inhaler    Mixed hyperlipidemia    Current Assessment & Plan     Not on Rx meds, takes niacin.    The ASCVD Risk score (Helder LISA Jr., et al., 2013) failed to calculate for the following reasons:    The 2013 ASCVD risk score is only valid for ages 40 to 79             Relevant Orders    CBC Auto Differential    Lipid Panel    Comprehensive Metabolic Panel    Overactive bladder    Current Assessment & Plan     Stable on ditropan 5 mg daily.  Works well for her.  Gets up twice at night to use the restroom.           Relevant Medications    oxybutynin (DITROPAN-XL) 5 MG TR24    Essential hypertension    Current Assessment & Plan     BP controlled on HCTZ 25 mg daily, no SOB/CP/HA.  Has been exercising as much as she can around the house.             Relevant Medications    hydroCHLOROthiazide (HYDRODIURIL) 25 MG tablet    Atherosclerosis of aorta    Current Assessment & Plan     On ASA.            Hypertensive kidney disease with stage 3b chronic kidney disease    Current Assessment & Plan     Labs stable in 2021.  GFR 44.           Prediabetes    Current Assessment & Plan     A1C 6.2 in 2021, improved from 6.4.           Relevant Orders    Hemoglobin A1C          Health Maintenance   Topic Date Due    TETANUS VACCINE  Never done    Lipid Panel  2026       Past Medical History:   Diagnosis Date    Senile osteoporosis 2020       Past Surgical History:   Procedure Laterality Date    ADENOIDECTOMY      EYE SURGERY      TONSILLECTOMY         family history includes Cancer in her son; Heart disease in her mother; Hypertension in her father; Stroke in her father.    Social History     Tobacco Use    Smoking status: Former Smoker     Packs/day: 0.25     Years: 10.00     Pack years: 2.50     Types: Cigarettes     Start date: 1943     Quit date: 1953     Years since quittin.9    Smokeless tobacco: Never Used    Tobacco comment: Extremely light usage.   Substance Use Topics    Alcohol use: Not Currently     Alcohol/week: 0.0 standard drinks    Drug use: Never       Review of Systems   Constitutional: Negative for chills and fever.   HENT: Negative for sore throat.    Respiratory: Negative for cough and shortness of breath.    Cardiovascular: Negative for chest pain and palpitations.   Gastrointestinal: Negative for constipation, diarrhea, nausea and vomiting.   Genitourinary: Negative for dysuria and hematuria.   Musculoskeletal: Negative for falls.   Neurological: Negative for headaches.        Outpatient Encounter Medications as of 3/29/2022   Medication Sig Dispense Refill    ascorbic acid, vitamin C, (VITAMIN C) 500 MG tablet Take 500 mg by mouth once daily.      aspirin-calcium carbonate 81 mg-300 mg calcium(777 mg) Tab Take 81 mg by mouth once daily.      coenzyme Q10 100 mg capsule Take 100 mg by mouth once daily.      folic acid/multivit-min/lutein (CENTRUM  "SILVER ORAL) Take 1 capsule by mouth once daily.      glucosamine-chondroitin 500-400 mg tablet Take 1 tablet by mouth once daily.      niacin 500 MG CpSR Take 250 mg by mouth every evening.      [DISCONTINUED] albuterol (PROAIR HFA) 90 mcg/actuation inhaler Inhale 2 puffs into the lungs every 6 (six) hours as needed for Wheezing. Rescue 18 g 5    [DISCONTINUED] hydroCHLOROthiazide (HYDRODIURIL) 25 MG tablet Take 1 tablet (25 mg total) by mouth once daily. 90 tablet 3    [DISCONTINUED] levothyroxine (SYNTHROID) 100 MCG tablet Take 1 tablet (100 mcg total) by mouth once daily. 90 tablet 3    [DISCONTINUED] oxybutynin (DITROPAN-XL) 5 MG TR24 Take 1 tablet (5 mg total) by mouth once daily. 90 tablet 3    albuterol (PROAIR HFA) 90 mcg/actuation inhaler Inhale 2 puffs into the lungs every 6 (six) hours as needed for Wheezing. Rescue 18 g 5    hydroCHLOROthiazide (HYDRODIURIL) 25 MG tablet Take 1 tablet (25 mg total) by mouth once daily. 90 tablet 3    levothyroxine (SYNTHROID) 100 MCG tablet Take 1 tablet (100 mcg total) by mouth once daily. 90 tablet 3    oxybutynin (DITROPAN-XL) 5 MG TR24 Take 1 tablet (5 mg total) by mouth once daily. 90 tablet 3    [DISCONTINUED] metroNIDAZOLE (FLAGYL) 500 MG tablet Take 1 tablet (500 mg total) by mouth 3 (three) times daily. 21 tablet 0     No facility-administered encounter medications on file as of 3/29/2022.        Review of patient's allergies indicates:   Allergen Reactions    Bactrim [sulfamethoxazole-trimethoprim]     Ciprofloxacin        Physical Exam      Vital Signs  Temp: 98.1 °F (36.7 °C)  Temp src: Oral  Pulse: 64  Resp: 16  SpO2: 96 %  BP: 128/84  BP Location: Left arm  Patient Position: Sitting  Height and Weight  Height: 5' 4" (162.6 cm)  Weight: 70.8 kg (156 lb 3.1 oz)  BSA (Calculated - sq m): 1.79 sq meters  BMI (Calculated): 26.8  Weight in (lb) to have BMI = 25: 145.3]    Physical Exam  Constitutional:       Appearance: She is well-developed. "   HENT:      Head: Normocephalic and atraumatic.      Right Ear: External ear normal.      Left Ear: External ear normal.   Eyes:      General:         Right eye: No discharge.         Left eye: No discharge.   Cardiovascular:      Rate and Rhythm: Normal rate and regular rhythm.      Heart sounds: Normal heart sounds. No murmur heard.  Pulmonary:      Effort: Pulmonary effort is normal. No respiratory distress.      Breath sounds: Normal breath sounds.   Abdominal:      General: There is no distension.      Palpations: Abdomen is soft.      Tenderness: There is no abdominal tenderness. There is no guarding.   Musculoskeletal:         General: Normal range of motion.      Cervical back: Normal range of motion.   Skin:     General: Skin is warm and dry.   Neurological:      Mental Status: She is alert and oriented to person, place, and time.   Psychiatric:         Behavior: Behavior normal.          Laboratory:  CBC:  No results for input(s): WBC, RBC, HGB, HCT, PLT, MCV, MCH, MCHC in the last 2160 hours.  CMP:  No results for input(s): GLU, CALCIUM, ALBUMIN, PROT, NA, K, CO2, CL, BUN, ALKPHOS, ALT, AST, BILITOT in the last 2160 hours.    Invalid input(s): CREATININ  URINALYSIS:  No results for input(s): COLORU, CLARITYU, SPECGRAV, PHUR, PROTEINUA, GLUCOSEU, BILIRUBINCON, BLOODU, WBCU, RBCU, BACTERIA, MUCUS, NITRITE, LEUKOCYTESUR, UROBILINOGEN, HYALINECASTS in the last 2160 hours.   LIPIDS:  No results for input(s): TSH, HDL, CHOL, TRIG, LDLCALC, CHOLHDL, NONHDLCHOL, TOTALCHOLEST in the last 2160 hours.  TSH:  No results for input(s): TSH in the last 2160 hours.  A1C:  No results for input(s): HGBA1C in the last 2160 hours.    Radiology:  No results found in the last 30 days.     Assessment/Plan     Pradip Zelaya is a 89 y.o.female with:    1. Essential hypertension  - hydroCHLOROthiazide (HYDRODIURIL) 25 MG tablet; Take 1 tablet (25 mg total) by mouth once daily.  Dispense: 90 tablet; Refill: 3    2. Mixed  hyperlipidemia  - CBC Auto Differential; Future  - Lipid Panel; Future  - Comprehensive Metabolic Panel; Future    3. Mild chronic obstructive pulmonary disease  - albuterol (PROAIR HFA) 90 mcg/actuation inhaler; Inhale 2 puffs into the lungs every 6 (six) hours as needed for Wheezing. Rescue  Dispense: 18 g; Refill: 5    4. Atherosclerosis of aorta    5. Overactive bladder  - oxybutynin (DITROPAN-XL) 5 MG TR24; Take 1 tablet (5 mg total) by mouth once daily.  Dispense: 90 tablet; Refill: 3    6. Hypertensive kidney disease with stage 3b chronic kidney disease    7. Acquired hypothyroidism  - TSH; Future  - T4, Free; Future  - levothyroxine (SYNTHROID) 100 MCG tablet; Take 1 tablet (100 mcg total) by mouth once daily.  Dispense: 90 tablet; Refill: 3    8. Prediabetes  - Hemoglobin A1C; Future      -Continue current medications and maintain follow up with specialists.    -Follow up in about 6 months (around 9/29/2022) for follow up of medical problems.       Herminia Patiño MD  [facility]

## 2022-03-29 NOTE — ASSESSMENT & PLAN NOTE
BP controlled on HCTZ 25 mg daily, no SOB/CP/HA.  Has been exercising as much as she can around the house.

## 2022-03-29 NOTE — ASSESSMENT & PLAN NOTE
Stable with albuterol inhaler and nebs PRN.  Stopped Breo 2/2 hoarsness.  Does well.  No SOB/wheezing.  Uses inhaler in AM usually, occasionally in the afternoon but not every day.

## 2022-05-03 ENCOUNTER — HOSPITAL ENCOUNTER (OUTPATIENT)
Dept: RADIOLOGY | Facility: HOSPITAL | Age: 87
Discharge: HOME | End: 2022-05-03
Attending: ORTHOPAEDIC SURGERY
Payer: MEDICARE

## 2022-05-03 ENCOUNTER — OFFICE VISIT (OUTPATIENT)
Dept: ORTHOPEDICS | Facility: CLINIC | Age: 87
End: 2022-05-03
Payer: MEDICARE

## 2022-05-03 VITALS — HEIGHT: 62 IN | WEIGHT: 159.38 LBS | BODY MASS INDEX: 29.33 KG/M2

## 2022-05-03 DIAGNOSIS — M25.551 HIP PAIN, RIGHT: Primary | ICD-10-CM

## 2022-05-03 DIAGNOSIS — M54.50 LUMBAR PAIN: Primary | ICD-10-CM

## 2022-05-03 DIAGNOSIS — M25.551 HIP PAIN, RIGHT: ICD-10-CM

## 2022-05-03 DIAGNOSIS — M43.06 LUMBAR SPONDYLOLYSIS: Primary | ICD-10-CM

## 2022-05-03 DIAGNOSIS — M54.50 LUMBAR PAIN: ICD-10-CM

## 2022-05-03 PROCEDURE — 73502 X-RAY EXAM HIP UNI 2-3 VIEWS: CPT | Mod: 26,RT,, | Performed by: RADIOLOGY

## 2022-05-03 PROCEDURE — 72100 X-RAY EXAM L-S SPINE 2/3 VWS: CPT | Mod: TC

## 2022-05-03 PROCEDURE — 99203 OFFICE O/P NEW LOW 30 MIN: CPT | Mod: S$PBB,,, | Performed by: ORTHOPAEDIC SURGERY

## 2022-05-03 PROCEDURE — 99213 OFFICE O/P EST LOW 20 MIN: CPT | Mod: PBBFAC | Performed by: ORTHOPAEDIC SURGERY

## 2022-05-03 PROCEDURE — 99203 PR OFFICE/OUTPT VISIT, NEW, LEVL III, 30-44 MIN: ICD-10-PCS | Mod: S$PBB,,, | Performed by: ORTHOPAEDIC SURGERY

## 2022-05-03 PROCEDURE — 99999 PR PBB SHADOW E&M-EST. PATIENT-LVL III: ICD-10-PCS | Mod: PBBFAC,,, | Performed by: ORTHOPAEDIC SURGERY

## 2022-05-03 PROCEDURE — 72100 XR LUMBAR SPINE AP AND LATERAL: ICD-10-PCS | Mod: 26,,, | Performed by: RADIOLOGY

## 2022-05-03 PROCEDURE — 99999 PR PBB SHADOW E&M-EST. PATIENT-LVL III: CPT | Mod: PBBFAC,,, | Performed by: ORTHOPAEDIC SURGERY

## 2022-05-03 PROCEDURE — 73502 XR HIP WITH PELVIS WHEN PERFORMED, 2 OR 3  VIEWS RIGHT: ICD-10-PCS | Mod: 26,RT,, | Performed by: RADIOLOGY

## 2022-05-03 PROCEDURE — 73502 X-RAY EXAM HIP UNI 2-3 VIEWS: CPT | Mod: TC,RT

## 2022-05-03 PROCEDURE — 72100 X-RAY EXAM L-S SPINE 2/3 VWS: CPT | Mod: 26,,, | Performed by: RADIOLOGY

## 2022-05-04 PROBLEM — M43.06 LUMBAR SPONDYLOLYSIS: Status: ACTIVE | Noted: 2022-05-04

## 2022-05-04 NOTE — PROGRESS NOTES
Subjective:       Patient ID: Pradip Zelaya is a 89 y.o. female.    Chief Complaint:   Pain of the Right Hip  She is a 89-year-old patient who comes in with pain in the right greater than left upper buttock and low back area off and on for the last 6 months.  There was no fall, accident, injury.  No history of pertinent surgery the pain is worst in the morning when she 1st gets up to move around.  Her pain is about 4/10 right now, and she comes in and out of the office on a walker without apparent difficulty.  No distal numbness or tingling in the lower extremities.  She has not noticed any weakness.  No groin pain.  No knee pain.    Past Medical History:   Diagnosis Date    Senile osteoporosis 2020     Past Surgical History:   Procedure Laterality Date    ADENOIDECTOMY      EYE SURGERY      TONSILLECTOMY       Family History   Problem Relation Age of Onset    Heart disease Mother              Hypertension Father              Stroke Father              Cancer Son               Social History     Socioeconomic History    Marital status:    Tobacco Use    Smoking status: Former Smoker     Packs/day: 0.25     Years: 10.00     Pack years: 2.50     Types: Cigarettes     Start date: 1943     Quit date: 1953     Years since quittin.0    Smokeless tobacco: Never Used    Tobacco comment: Extremely light usage.   Substance and Sexual Activity    Alcohol use: Not Currently     Alcohol/week: 0.0 standard drinks    Drug use: Never    Sexual activity: Not Currently     Social Determinants of Health     Financial Resource Strain: Low Risk     Difficulty of Paying Living Expenses: Not hard at all   Food Insecurity: No Food Insecurity    Worried About Running Out of Food in the Last Year: Never true    Ran Out of Food in the Last Year: Never true   Transportation Needs: No Transportation Needs    Lack of Transportation (Medical): No     Lack of Transportation (Non-Medical): No   Physical Activity: Insufficiently Active    Days of Exercise per Week: 2 days    Minutes of Exercise per Session: 20 min   Stress: No Stress Concern Present    Feeling of Stress : Only a little   Social Connections: Unknown    Frequency of Communication with Friends and Family: Twice a week    Frequency of Social Gatherings with Friends and Family: Patient refused    Active Member of Clubs or Organizations: No    Attends Club or Organization Meetings: Never    Marital Status:    Housing Stability: Unknown    Unable to Pay for Housing in the Last Year: No    Number of Places Lived in the Last Year: 1    Unstable Housing in the Last Year: Patient refused       Current Outpatient Medications   Medication Sig Dispense Refill    albuterol (PROAIR HFA) 90 mcg/actuation inhaler Inhale 2 puffs into the lungs every 6 (six) hours as needed for Wheezing. Rescue 18 g 5    ascorbic acid, vitamin C, (VITAMIN C) 500 MG tablet Take 500 mg by mouth once daily.      aspirin-calcium carbonate 81 mg-300 mg calcium(777 mg) Tab Take 81 mg by mouth once daily.      coenzyme Q10 100 mg capsule Take 100 mg by mouth once daily.      folic acid/multivit-min/lutein (CENTRUM SILVER ORAL) Take 1 capsule by mouth once daily.      glucosamine-chondroitin 500-400 mg tablet Take 1 tablet by mouth once daily.      hydroCHLOROthiazide (HYDRODIURIL) 25 MG tablet Take 1 tablet (25 mg total) by mouth once daily. 90 tablet 3    levothyroxine (SYNTHROID) 100 MCG tablet Take 1 tablet (100 mcg total) by mouth once daily. 90 tablet 3    niacin 500 MG CpSR Take 250 mg by mouth every evening.      oxybutynin (DITROPAN-XL) 5 MG TR24 Take 1 tablet (5 mg total) by mouth once daily. 90 tablet 3     No current facility-administered medications for this visit.     Review of patient's allergies indicates:   Allergen Reactions    Bactrim [sulfamethoxazole-trimethoprim]     Ciprofloxacin   "        Objective:      Vitals:    05/03/22 1523   Weight: 72.3 kg (159 lb 6.3 oz)   Height: 5' 1.81" (1.57 m)     Physical Exam  She is diffusely tender in the paraspinous muscles of lumbar spine right greater than left.  No pain with percussion in the midline.  With attempted range of motion.  Negative Stinchfield, negative C sign knees benign without tenderness or effusion.  Normal range of knee motion.  Distal neurovascular intact.  Flip test negative.  Imaging Review:   She made the appointment complaining of hip pain, so we initially got hip x-rays which were essentially unremarkable except for advanced osteoporosis and signs of degenerative changes of the lumbar spine.  She was sent back for AP/lateral of the lumbar spine which shows extensive spondylosis and disc space narrowing.  Assessment:   Low back pain related to spondylosis and disc disease.  Doubt compression fracture.  Plan:       She is reassured that her hips are normal.  She is referred to the Pain Management section for further evaluation and help with her pain.  I told her that they may wish to do additional imaging studies, but that we would leave this up to them.    The patient's pathophysiology was explained in detail with reference to x-rays, models, other visual aids as appropriate.  Treatment options were discussed in detail.  Questions were invited and answered to the patient's and granddaughter's satisfaction.    "

## 2022-05-19 ENCOUNTER — PATIENT OUTREACH (OUTPATIENT)
Dept: ADMINISTRATIVE | Facility: OTHER | Age: 87
End: 2022-05-19
Payer: MEDICARE

## 2022-05-19 NOTE — PROGRESS NOTES
Health Maintenance Due   Topic Date Due    TETANUS VACCINE  Never done    Shingles Vaccine (2 of 3) 11/11/2016    COVID-19 Vaccine (4 - Booster for Pfizer series) 02/07/2022     Updates were requested from care everywhere.  Chart was reviewed for overdue Proactive [facility] Encounters ([facility]) topics (CRS, Breast Cancer Screening, Eye exam)  Health Maintenance has been updated.  LINKS immunization registry triggered.  Immunizations were reconciled.

## 2022-05-24 ENCOUNTER — OFFICE VISIT (OUTPATIENT)
Dept: PAIN MEDICINE | Facility: CLINIC | Age: 87
End: 2022-05-24
Payer: MEDICARE

## 2022-05-24 VITALS
WEIGHT: 159.38 LBS | HEART RATE: 94 BPM | BODY MASS INDEX: 29.33 KG/M2 | SYSTOLIC BLOOD PRESSURE: 184 MMHG | DIASTOLIC BLOOD PRESSURE: 83 MMHG

## 2022-05-24 DIAGNOSIS — M43.16 SPONDYLOLISTHESIS OF LUMBAR REGION: ICD-10-CM

## 2022-05-24 DIAGNOSIS — M54.41 CHRONIC BILATERAL LOW BACK PAIN WITH RIGHT-SIDED SCIATICA: ICD-10-CM

## 2022-05-24 DIAGNOSIS — M47.816 LUMBAR SPONDYLOSIS: Primary | ICD-10-CM

## 2022-05-24 DIAGNOSIS — G89.29 CHRONIC BILATERAL LOW BACK PAIN WITH RIGHT-SIDED SCIATICA: ICD-10-CM

## 2022-05-24 PROCEDURE — 99999 PR PBB SHADOW E&M-EST. PATIENT-LVL IV: ICD-10-PCS | Mod: PBBFAC,,, | Performed by: PHYSICAL MEDICINE & REHABILITATION

## 2022-05-24 PROCEDURE — 99204 OFFICE O/P NEW MOD 45 MIN: CPT | Mod: S$PBB,,, | Performed by: PHYSICAL MEDICINE & REHABILITATION

## 2022-05-24 PROCEDURE — 99999 PR PBB SHADOW E&M-EST. PATIENT-LVL IV: CPT | Mod: PBBFAC,,, | Performed by: PHYSICAL MEDICINE & REHABILITATION

## 2022-05-24 PROCEDURE — 99204 PR OFFICE/OUTPT VISIT, NEW, LEVL IV, 45-59 MIN: ICD-10-PCS | Mod: S$PBB,,, | Performed by: PHYSICAL MEDICINE & REHABILITATION

## 2022-05-24 PROCEDURE — 99214 OFFICE O/P EST MOD 30 MIN: CPT | Mod: PBBFAC,PO | Performed by: PHYSICAL MEDICINE & REHABILITATION

## 2022-05-24 NOTE — PROGRESS NOTES
[facility]  New Patient H&P    Referring Provider: George Flores Md  [id number] [place] [address]    Chief Complaint:   Chief Complaint   Patient presents with    Low-back Pain       History of Present Illness: Pradip Zelaya is a 89 y.o. female referred by Dr. George Flores for low back pain.      Onset: 8 months  Location: bilateral low back  Radiation: right leg?  Timing: constant  Quality: Aching  Exacerbating Factors: lying down  Alleviating Factors: sitting  Associated Symptoms: denies night fever/night sweats, urinary incontinence, bowel incontinence, significant weight loss, significant motor weakness and loss of sensations    Today she reports her pain is resolved, but this is due to having taken a medrol dose pack.  Pain, when it was present, was sharp and isolated to the low back. She would take tylenol with some mild benefit. No paresthesias reported. No LE weakness. No B/b issues. No falls. At the point when she was in pain, had to use a RW which is abnormal for her.  Now she's ambulating with single point cane just for longer distances in the community. She has been using these medrol dose packs for flare-ups, but she does have a history of osteoporosis.     Severity: Currently: 0 /10   Typical Range: 0-0/10     Exacerbation: 0/10     Pain Disability Index  Family/Home Responsibilities:: 5  Recreation:: 5  Social Activity:: 2  Occupation:: 2  Sexual Behavior:: 0  Self Care:: 7  Life-Support Activities:: 8  Pain Disability Index (PDI): 29    Previous Interventions:  - none    Previous Therapies:  PT/OT: no   Relevant Surgery: no   Previous Medications:   - NSAIDS: can't due to renal function  - Muscle Relaxants: -   - TCAs: -  - SNRIs: -  - Topicals: -  - Anticonvulsants: -   - Opioids: -    Current Pain Medications:  1. Tylenol  2. Medrol dosepak      Blood Thinners: ASA 81 mg     Full Medication List:    Current Outpatient Medications:     albuterol (PROAIR HFA) 90  mcg/actuation inhaler, Inhale 2 puffs into the lungs every 6 (six) hours as needed for Wheezing. Rescue, Disp: 18 g, Rfl: 5    ascorbic acid, vitamin C, (VITAMIN C) 500 MG tablet, Take 500 mg by mouth once daily., Disp: , Rfl:     aspirin-calcium carbonate 81 mg-300 mg calcium(777 mg) Tab, Take 81 mg by mouth once daily., Disp: , Rfl:     coenzyme Q10 100 mg capsule, Take 100 mg by mouth once daily., Disp: , Rfl:     folic acid/multivit-min/lutein (CENTRUM SILVER ORAL), Take 1 capsule by mouth once daily., Disp: , Rfl:     glucosamine-chondroitin 500-400 mg tablet, Take 1 tablet by mouth once daily., Disp: , Rfl:     hydroCHLOROthiazide (HYDRODIURIL) 25 MG tablet, Take 1 tablet (25 mg total) by mouth once daily., Disp: 90 tablet, Rfl: 3    levothyroxine (SYNTHROID) 100 MCG tablet, Take 1 tablet (100 mcg total) by mouth once daily., Disp: 90 tablet, Rfl: 3    niacin 500 MG CpSR, Take 250 mg by mouth every evening., Disp: , Rfl:     oxybutynin (DITROPAN-XL) 5 MG TR24, Take 1 tablet (5 mg total) by mouth once daily., Disp: 90 tablet, Rfl: 3     Review of Systems:  Review of Systems   Constitutional: Negative for chills and fever.   HENT: Positive for hearing loss.    Eyes: Negative for blurred vision and double vision.   Respiratory: Positive for shortness of breath. Negative for cough.    Cardiovascular: Positive for leg swelling. Negative for chest pain.   Gastrointestinal: Negative for heartburn, nausea and vomiting.   Genitourinary: Negative for frequency and urgency.   Musculoskeletal: Negative for back pain and joint pain.   Skin: Negative for itching and rash.   Neurological: Negative for focal weakness and weakness.   Psychiatric/Behavioral: The patient is not nervous/anxious and does not have insomnia.        Allergies:  Bactrim [sulfamethoxazole-trimethoprim] and Ciprofloxacin     Medical History:   has a past medical history of Senile osteoporosis (1/7/2020).    Surgical History:   has a past  surgical history that includes Eye surgery; Tonsillectomy; and Adenoidectomy (1934).    Family History:  family history includes Cancer in her son; Heart disease in her mother; Hypertension in her father; Stroke in her father.    Social History:   reports that she quit smoking about 69 years ago. Her smoking use included cigarettes. She started smoking about 79 years ago. She has a 2.50 pack-year smoking history. She has never used smokeless tobacco. She reports previous alcohol use. She reports that she does not use drugs.    Physical Exam:  BP (!) 184/83   Pulse 94   Wt 72.3 kg (159 lb 6.3 oz)   BMI 29.33 kg/m²   GEN: No acute distress. Calm, comfortable  HENT: Normocephalic, atraumatic, moist mucous membranes  EYE: Anicteric sclera, non-injected.   CV: Non-diaphoretic. Regular Rate. Radial Pulses 2+.  RESP: Breathing comfortably. Chest expansion symmetric.  EXT: No clubbing, cyanosis.   SKIN: Warm, & dry to palpation. No visible rashes or lesions of exposed skin.   PSYCH: Pleasant mood and appropriate affect. Recent and remote memory intact.   GAIT: Mod I,  Ambulation with SPC  Lumbar Spine Exam:       Inspection: No erythema, bruising. No surgical incisions      Palpation: (-) TTP of sacroiliac joint bilaterally.       ROM: Not Limited in flexion, extension, lateral bending      (-) Facet loading bilaterally      (-) Straight Leg Raise bilaterally      (-) Lasegue sign bilaterally      (-) SULEMAN bilaterally      () SIJ Compression Test bilaterally   Neurologic Exam:     Alert. Speech is fluent and appropriate.     Strength: 5/5 throughout bilateral lower extremities     Sensation: Grossly intact to light touch in bilateral lower extremities     Reflexes: 2+ in b/l patella, achilles     Tone: No abnormality appreciated in bilateral lower extremities     No Clonus     Downgoing toes on plantar stimulation     (-) Herrera bilaterally                Imaging:  - X-ray lumbar spine 5/3/22:  Lumbar spine two views:  There is a levoconvex curvature of the lumbar spine.  There is mild-moderate DJD.  No fracture dislocation bone destruction seen.  There is grade 1 anterolisthesis of L5 on S1    Labs:  BMP  Lab Results   Component Value Date     03/29/2022    K 4.0 03/29/2022    CL 97 03/29/2022    CO2 30 (H) 03/29/2022    BUN 21 03/29/2022    CREATININE 0.9 03/29/2022    CALCIUM 10.5 03/29/2022    ANIONGAP 12 03/29/2022    ESTGFRAFRICA >60.0 03/29/2022    EGFRNONAA 54.9 (A) 03/29/2022     Lab Results   Component Value Date    ALT 18 03/29/2022    AST 27 03/29/2022    ALKPHOS 65 03/29/2022    BILITOT 1.0 03/29/2022     Lab Results   Component Value Date     03/29/2022     Lab Results   Component Value Date    HGBA1C 6.6 (H) 03/29/2022         Assessment:  Pradip Zelaya is a 89 y.o. female with the following diagnoses based on history, exam, and imaging:    Problem List Items Addressed This Visit        Orthopedic    Lumbar spondylolysis      Other Visit Diagnoses     Lumbar spondylosis    -  Primary    Relevant Orders    Ambulatory referral/consult to Physical/Occupational Therapy    Chronic bilateral low back pain with right-sided sciatica              This is a pleasant 89 y.o. lady presenting with:     - Low back pain most likely secondary to lumbar spondylosis and facet arthropathy with spondylolisthesis at L5-S1.  Today she is not in any pain since taking a medrol dose pack. At this point we will be conservative with our treatment and order PT to address core and spinal stability/felxibility, as well as work on walking balance and fall prevention.    - Comorbidities: HLD, HTN, OAB, COPD, hypothyroidism, prediabetes, CKD3    Treatment Plan:   - PT/OT/HEP: PT ordered. Discussed benefits of exercise for pain.   - Procedures: none at this time. Can consider MBB in future if sub-optimal benefit from PT  - Medications: No changes recommended at this time.  - Imaging: Reviewed. If pain returns and is significant and  limiting PT, plan to order lumbar XR with Flex/Ext and lumbar MRI and will f/u after imaging  - Labs: Reviewed.  Medications are appropriately dosed for current hepatorenal function.    Follow Up: RTC 2-3 months after completion of PT    Jamshid Cote, DO  [facility] [facility] PGY4    The patient was seen and examined with the resident. I agree with the above documentation, and have edited as necessary.     Gisel Ribeiro M.D.  Interventional Pain Medicine / Physical Medicine & Rehabilitation    Disclaimer: This note was partly generated using dictation software which may occasionally result in transcription errors.

## 2022-05-30 ENCOUNTER — CLINICAL SUPPORT (OUTPATIENT)
Dept: REHABILITATION | Facility: HOSPITAL | Age: 87
End: 2022-05-30
Attending: STUDENT IN AN ORGANIZED HEALTH CARE EDUCATION/TRAINING PROGRAM
Payer: MEDICARE

## 2022-05-30 DIAGNOSIS — M47.816 LUMBAR SPONDYLOSIS: Primary | ICD-10-CM

## 2022-05-30 PROCEDURE — 97110 THERAPEUTIC EXERCISES: CPT | Mod: PN

## 2022-05-30 PROCEDURE — 97162 PT EVAL MOD COMPLEX 30 MIN: CPT | Mod: PN

## 2022-05-31 NOTE — PLAN OF CARE
"  [facility]  Physical Therapy Initial Evaluation/treatment    Name: Pradip Zelaya  Clinic Number: [id number]    Therapy Diagnosis:   Encounter Diagnosis   Name Primary?    Lumbar spondylosis      Physician: Jamshid Cote DO    Physician Orders:  PT eval and treat  Medical Diagnosis: M47.816 (ICD-10-CM) - Lumbar spondylosis   Evaluation Date: 5/30/2022  Authorization Period Expiration: 12/31/2022   Plan of Care Certification Period: 7/11/22  Visit # / Visits authorized: 1/ 50    Time In: 5:00  Time Out: 6:00  Total Billable Time: 60 minutes- 1 MCE, 1 TE    Precautions: COPD, osteoporosis, levoconvex curvature of lumbar spine, hard of hearing (can read lips)    Subjective   Pradip reports: 6-8 months. Pain started on left side, especially at night. About one month ago, moved to right side. Pain so bad that it "made my knees buckle". At night gets up to take 2 tylenol and goes to bed. When she sits, it feels better. 5-6 years ago, fell, broke ribs. Sits in a rolling stool during the day and plays on computer. Just finished a steroid pack (3rd in 8 months); decreased pain in low back usually lasts a couple of months. Injured her left shoulder (shoulder dislocation) 10 years ago- decreased active range of motion.       Past Medical History:   Diagnosis Date    Senile osteoporosis 1/7/2020     Pradip Zelaya  has a past surgical history that includes Eye surgery; Tonsillectomy; and Adenoidectomy (1934).    Pradip has a current medication list which includes the following prescription(s): albuterol, ascorbic acid (vitamin c), aspirin-calcium carbonate, coenzyme q10, folic acid/multivit-min/lutein, glucosamine-chondroitin, hydrochlorothiazide, levothyroxine, niacin, and oxybutynin.    Review of patient's allergies indicates:   Allergen Reactions    Bactrim [sulfamethoxazole-trimethoprim]     Ciprofloxacin         Imaging: FINDINGS:  Lumbar spine two views: There is a levoconvex " curvature of the lumbar spine.  There is mild-moderate DJD.  No fracture dislocation bone destruction seen.  There is grade 1 anterolisthesis of L5 on S1.      Prior Therapy: no  Social History: lives son and daughter-in-law. 1 threshold to enter  Occupation: retired  Prior Level of Function: independent  Current Level of Function: independent    Pain:  Current 0/10, worst 3/10, best 0/10   Location: bilateral low back  Description: Aching  Aggravating Factors: Standing and Laying  Easing Factors: medication    Pts goals: Doesn't want the pain to come back.    Objective     Palpation: Right lower extremity- greater trochanter, gluteus medius, minimus, piriformis, ASIS- tender  Left lower extremity: greater trochanter, gluteus minimus, ASIS- tender; pain with hip ER    Posture:  Rounded shoulders, trunk flexion in sitting    Gross movement analysis:  -Gait: Decreased stance phase on left lower extremity (limp), use of single point cane.  -Forward bending: within normal limits  -Squat: within normal limits    Lumbar Range of Motion:    Degrees Pain   Flexion WNL Left sided lumbar pain     Extension 50% NP   Left Side Bending WNL NP   Right Side Bending WNL NP   Left rotation   WNL NP   Right Rotation   WNL NP      Range of Motion:   LE Right Left   Hip flexion WNL WNL   Hip abduction WNL WNL   Hip ER WNL WNL   Hip IR  WNL WNL   Hip extension WNL WNL   Knee WNL WNL   Ankle DF WNL WNL     Lower Extremity Strength   Right Left   Hip flexion: 5/5 5/5   Hip extension: 5/5 5/5   Hip abduction: 5/5 5/5   Hip ER:  5/5 5/5   Hip IR: 5/5 5/5   Knee extension: 5/5 5/5   Knee flexion: 5/5 5/5   Ankle dorsiflexion: 5/5 5/5   Great toe extension: 5/5 5/5     Upper Extremity Strength/ range of motion    Right Left   Shoulder flexion: 5/5 5/5 ~130 degrees   Shoulder abduction: 5/5 5/5~ 130 degrees   Shoulder ER:  5/5 4/5   Shoulder IR: 5/5 5/5     Joint Mobility: Lumbar mobility- within normal limits, no pain with posterior to  anterior vertebral joint mobilization      5 times sit-stand  (adults 18-63 y/o) 16 seconds  >12 sec= fall risk for general elderly  >16 sec= fall risk for Parkinson's disease  >10 sec= balance/vestibular dysfunction (<61 y/o)  >14.2 sec= balance/vestibular dysfunction (>61 y/o)  >12 sec= fall risk for CVA       CMS Impairment/Limitation/Restriction for FOTO Lumbar Survey    Therapist reviewed FOTO scores for Pradip Zelaya on 2022.   FOTO documents entered into EPIC - see Media section.    Limitation Score: 47%    Modified oswestry: 38%         TREATMENT   Treatment Time In: 5:50  Treatment Time Out: 6:00  Total Treatment time separate from Evaluation time:10 mins    Pradip received therapeutic exercises to develop strength and core stabilization for 10 minutes including:  + Posterior pelvic tilt (PPT)/transversus abdominus (TrA) activation with diaphragmatic breathin min  + Hip adduction with ball with PPT, TrA with diaphragmatic breathin min  + Hip abduction isometric with PPT, TrA activation with diaphragmatic breathin min    Home Exercises and Patient Education Provided    Education provided re: trial of therapy 1x/wk for 4 weeks; if pain does not return to low back (Due to recent round of steroids), will discharge.    Written Home Exercises Provided: .  Exercises were reviewed and Pradip was able to demonstrate them prior to the end of the session.   Pt received a written copy of exercises to perform at home. Pradip demonstrated good  understanding of the education provided.     See EMR under patient instructions for exercises given.   Assessment   Pradip is a 89 y.o. female referred to outpatient Physical Therapy with a medical diagnosis of Lumbar spondylosis. Pt presents with little to no reproducible pain due to recent round of steroids which historically decreases her pain significantly. She describes lumbar spondylosis in her subjective with decreased pain while seated, increased  pain with lumbar extension (standing or lying flat). Pt demonstrates a low fall risk per 5 time sit to stand test. Will benefit from therapy services to improve abdominal strengthening, core activation to promote decreased pain in the low back once her steroids wear off.     Pt prognosis is Good.   Pt will benefit from skilled outpatient Physical Therapy to address the deficits stated above and in the chart below, provide pt/family education, and to maximize pt's level of independence.     Plan of care discussed with patient: Yes with patient and patients daughter-in-law  Pt's spiritual, cultural and educational needs considered and patient is agreeable to the plan of care and goals as stated below:     Anticipated Barriers for therapy: hard of hearing    Medical Necessity is demonstrated by the following  History  Co-morbidities and personal factors that may impact the plan of care Co-morbidities:   advanced age and levoscoliosis, anterolisthesis, osteoporosis    Personal Factors:   hard of hearing, hx of broken ribs, left shoulder subluxation, dowagers hump     high   Examination  Body Structures and Functions, activity limitations and participation restrictions that may impact the plan of care Body Regions:   neck  back  lower extremities  upper extremities    Body Systems:    gross symmetry  gait  motor control    Participation Restrictions:   none    Activity limitations:   Learning and applying knowledge  no deficits    General Tasks and Commands  no deficits    Communication  no deficits    Mobility  walking    Self care  no deficits    Domestic Life  no deficits    Interactions/Relationships  no deficits    Life Areas  no deficits    Community and Social Life  recreation and leisure         moderate   Clinical Presentation evolving clinical presentation with changing clinical characteristics moderate   Decision Making/ Complexity Score: moderate     Goals:  Short Term Goals: 3-4 weeks: 6/30/22  1.  Patient  will demonstrate compliance and full knowledge of how to perform home exercise program to manage advanced lumbar spondylosis symptoms. Not met, progressing  2.  Patient will perform 5x sit<>stand test within </=12 seconds to denote no fall risk for her age group. Not met, progressing  3.  Patient will report </=2/10 pain in low back with standing/ambulating for >/=10 mins with use of assistive device. Not met, progressing  4. Pt will improve FOTO score to >/=58% to denote substantial change since evaluation. Not met, progressing      Plan   Certification Period/Plan of care expiration: 5/30/2022 to 6/30/22.    Outpatient Physical Therapy 1 times weekly for 4 weeks (trial of therapy- if no pain by end of 4 weeks, discharge with updated home exercise program) to include the following interventions: Gait Training, Manual Therapy, Neuromuscular Re-ed, Therapeutic Activities and Therapeutic Exercise.     Moraima Eng, PT, DPT  5/31/2022

## 2022-06-10 ENCOUNTER — CLINICAL SUPPORT (OUTPATIENT)
Dept: REHABILITATION | Facility: HOSPITAL | Age: 87
End: 2022-06-10
Payer: MEDICARE

## 2022-06-10 DIAGNOSIS — R53.1 DECREASED STRENGTH: ICD-10-CM

## 2022-06-10 PROCEDURE — 97110 THERAPEUTIC EXERCISES: CPT | Mod: PN

## 2022-06-10 NOTE — PROGRESS NOTES
"  [facility]   Physical Therapy Treatment Note     Name: Pradip Zelaya  Clinic Number: [id number]    Therapy Diagnosis:   Encounter Diagnosis   Name Primary?    Decreased strength/endurance lower extremities      Physician: Jamshid Cote DO    Visit Date: 6/10/2022      Physician Orders:  PT eval and treat  Medical Diagnosis: M47.816 (ICD-10-CM) - Lumbar spondylosis   Evaluation Date: 2022  Authorization Period Expiration: 2022   Plan of Care Certification Period: 22  Visit # / Visits authorized:  (eval included)    PTA Visit #: 0/5     Time In: 8:03  Time Out: 9:00  Total Billable Time: 57 minutes-- 4 TE    SUBJECTIVE     Pt reports: no pain in low back. Has been doing her exercises. "It feels better".  She was compliant with home exercise program.  Response to previous treatment: none  Functional change: better movement    Pain: 0/10  Location: bilateral low back      OBJECTIVE     Objective Measures updated at progress report unless specified.     2 Minute Walk Test Instructions   General Information:   Individual walks without assistance for 2 minutes and the distance is measured   o assistive devices can be used but should be kept consistent and documented from test to test     Assistive Device and/or Bracing Used: single point cane  Distance ambulated in 2 minutes: 288 ft    Norm for USP age individuals: 493 ft +/- 75 ft  Minimal detectable change: 40 ft  Downloaded from www.rehabmeasures.org         Treatment     Pradip received the treatments listed below:      Pradip received therapeutic exercises to develop strength and core stabilization for 10 minutes including:  Posterior pelvic tilt (PPT)/transversus abdominus (TrA) activation with diaphragmatic breathin min  Hip adduction with ball with PPT, TrA with diaphragmatic breathin min  Hip abduction isometric with PPT, TrA activation with diaphragmatic breathin min  +Hip abduction " Problem: Fluid Volume Excess  Intervention: # Provide Heart Failure Patient Education packet within 24 hrs  HF teaching packet contains written discharge instructions for:  Fluid restriction, Heart Failure Action plan (for dyspnea/ankle swelling), How to take a correct daily weight, sodium restricted guidelines, staying active with Heart failure.  What to do when symptoms worsen.  Smoking Cessation Advice Included.    Pt has followed with Dr. Medrano and new to [facility] clinic.  Understands dx and symptom management.  States he has not been weighing himself daily, but can resume, and perhaps can do better avoiding salt.  We reviewed strategies. Pt mainly concerned that some of his care team are from [facility].  Will follow up with bedside.  Please continue to encourage pt and review HF materials prior to DC.    Update: Release of Info form filled out and to be sent to medical records.           with blue band, hook-lying 2x10 reps  +Bridges 2x10 reps    +Straight arm pull down sustained with unilateral hip flexion 2x10 reps bilaterally, red band  +Straight arm pull down, red band x 20 reps  +Rows x 20 reps, red band  +Paloff press x 20 reps bilaterally, red band  +Sit to stand, chair + airex pad - 2x10 reps    +NuStep L3, 8 min      Patient Education and Home Exercises     Home Exercises and Patient Education Provided     Education provided re: trial of therapy 1x/wk for 4 weeks; if pain does not return to low back (Due to recent round of steroids), will discharge.     Written Home Exercises Provided: 5/30.  Exercises were reviewed and Pradip was able to demonstrate them prior to the end of the session.   Pt received a written copy of exercises to perform at home. Pradip demonstrated good  understanding of the education provided.      See EMR under patient instructions for exercises given.       ASSESSMENT     Pt continues with no pain. Working on building her overall endurance, balance, standing tolerance and core stability. FOTO score today denotes nearly a 20 point improvement since evaluation. Will continue to check off goals, update home exercise program and monitor response to activity. May be ready to discharge within the next few visits.    Pradip Is progressing well towards her goals.   Pt prognosis is Good.     Pt will continue to benefit from skilled outpatient physical therapy to address the deficits listed in the problem list box on initial evaluation, provide pt/family education and to maximize pt's level of independence in the home and community environment.     Pt's spiritual, cultural and educational needs considered and pt agreeable to plan of care and goals.     Anticipated barriers to physical therapy: none    Goals:  Short Term Goals: 3-4 weeks: 6/30/22  1.  Patient will demonstrate compliance and full knowledge of how to perform home exercise program to manage advanced lumbar  spondylosis symptoms. Met, 6/10  2.  Patient will perform 5x sit<>stand test within </=12 seconds to denote no fall risk for her age group. Not met, progressing  3.  Patient will report </=2/10 pain in low back with standing/ambulating for >/=10 mins with use of assistive device. Not met, progressing  4. Pt will improve FOTO score to >/=58% to denote substantial change since evaluation. Met, 6/10      PLAN   Standing tolerance (goal >10 mins).  Core stabilization, functional strengthening.  pdate home exercise program.    Certification Period/Plan of care expiration: 5/30/2022 to 6/30/22.     Outpatient Physical Therapy 1 times weekly for 4 weeks (trial of therapy- if no pain by end of 4 weeks, discharge with updated home exercise program) to include the following interventions: Gait Training, Manual Therapy, Neuromuscular Re-ed, Therapeutic Activities and Therapeutic Exercise.       Moraima Eng, PT , DPT  6/10/2022

## 2022-06-14 ENCOUNTER — CLINICAL SUPPORT (OUTPATIENT)
Dept: REHABILITATION | Facility: HOSPITAL | Age: 87
End: 2022-06-14
Payer: MEDICARE

## 2022-06-14 DIAGNOSIS — R53.1 DECREASED STRENGTH: Primary | ICD-10-CM

## 2022-06-14 PROCEDURE — 97110 THERAPEUTIC EXERCISES: CPT | Mod: PN,CQ

## 2022-06-14 NOTE — PROGRESS NOTES
[facility]   Physical Therapy Treatment Note     Name: Pradip Zelaya  Clinic Number: [id number]    Therapy Diagnosis:   Encounter Diagnosis   Name Primary?    Decreased strength Yes     Physician: Jamshid Cote DO    Visit Date: 2022      Physician Orders:  PT eval and treat  Medical Diagnosis: M47.816 (ICD-10-CM) - Lumbar spondylosis   Evaluation Date: 2022  Authorization Period Expiration: 2022   Plan of Care Certification Period: 22  Visit # / Visits authorized:  (eval included)    PTA Visit #:      Time In: 10:00  Time Out: 11:00  Total Billable Time: 60 minutes-- 4 TE    SUBJECTIVE     Pt reports: Continues to report no lower back pain this date but is a little short of breath/tired today. Also reports new right sided groin pain that began about two days ago. Aggravated with prolonged walking and alleviated with sitting and resting.   She was compliant with home exercise program.  Response to previous treatment: none  Functional change: better movement    Pain: 0/10  Location: bilateral low back      OBJECTIVE     Objective Measures updated at progress report unless specified.       Treatment     Pradip received the treatments listed below:      Pradip received therapeutic exercises to develop strength and core stabilization for 60 minutes including:  Posterior pelvic tilt (PPT)/transversus abdominus (TrA) activation with diaphragmatic breathin min  Hip adduction with ball with PPT, TrA with diaphragmatic breathin min  Hip abduction isometric with PPT, TrA activation with diaphragmatic breathin min  Hip abduction with blue band, hook-lying 2x10 reps  Bridges 2x10 reps    Straight arm pull down sustained with unilateral hip flexion 2x10 reps bilaterally, red band  Straight arm pull down, red band x 20 reps  Rows x 20 reps, red band  Paloff press x 20 reps bilaterally, red band  Sit to stand, chair + airex pad - 2x10 reps    NuStep L3,  8 min    +Walking loops 3.38 minutes 3 laps x150ft ea no rest breaks      Patient Education and Home Exercises     Home Exercises and Patient Education Provided     Education provided re: trial of therapy 1x/wk for 4 weeks; if pain does not return to low back (Due to recent round of steroids), will discharge.     Written Home Exercises Provided: 5/30.  Exercises were reviewed and Pradip was able to demonstrate them prior to the end of the session.   Pt received a written copy of exercises to perform at home. Pradip demonstrated good  understanding of the education provided.      See EMR under patient instructions for exercises given.       ASSESSMENT     Pt continues with no pain. Working on building her overall endurance, balance, standing tolerance and core stability. Implemented walking into exercise program today to address endurance issues. Patient suffered not loss of balance and while fatigued reported no increased pain in lower back. Some increased right groin pain that patient reports is a new issue for the past two days but states it is only aggravated with prolonged walking and alleviated with sitting and resting. Continue to progress as appropriate.     Pradip Is progressing well towards her goals.   Pt prognosis is Good.     Pt will continue to benefit from skilled outpatient physical therapy to address the deficits listed in the problem list box on initial evaluation, provide pt/family education and to maximize pt's level of independence in the home and community environment.     Pt's spiritual, cultural and educational needs considered and pt agreeable to plan of care and goals.     Anticipated barriers to physical therapy: none    Goals:  Short Term Goals: 3-4 weeks: 6/30/22  1.  Patient will demonstrate compliance and full knowledge of how to perform home exercise program to manage advanced lumbar spondylosis symptoms. Met, 6/10  2.  Patient will perform 5x sit<>stand test within </=12 seconds to  denote no fall risk for her age group. Not met, progressing  3.  Patient will report </=2/10 pain in low back with standing/ambulating for >/=10 mins with use of assistive device. Not met, progressing  4. Pt will improve FOTO score to >/=58% to denote substantial change since evaluation. Met, 6/10      PLAN   Standing tolerance (goal >10 mins).  Core stabilization, functional strengthening.  pdate home exercise program.    Certification Period/Plan of care expiration: 5/30/2022 to 6/30/22.     Outpatient Physical Therapy 1 times weekly for 4 weeks (trial of therapy- if no pain by end of 4 weeks, discharge with updated home exercise program) to include the following interventions: Gait Training, Manual Therapy, Neuromuscular Re-ed, Therapeutic Activities and Therapeutic Exercise.       Meredith Deutsch, PTA   6/14/2022

## 2022-06-23 NOTE — PROGRESS NOTES
[facility]   Physical Therapy Treatment Note /discharge note    Name: Pradip Zelaya  Clinic Number: [id number]    Therapy Diagnosis:   Encounter Diagnosis   Name Primary?    Decreased strength Yes     Physician: Jamshid Cote DO    Visit Date: 6/24/2022      Physician Orders:  PT eval and treat  Medical Diagnosis: M47.816 (ICD-10-CM) - Lumbar spondylosis   Evaluation Date: 5/30/2022  Authorization Period Expiration: 12/31/2022   Plan of Care Certification Period: 7/11/22  Visit # / Visits authorized: 2/ 50 (eval included)    PTA Visit #: 1/5     Time In: 10:00  Time Out: 11:00  Total Billable Time: 60 minutes-- 4 TE    SUBJECTIVE     Pt reports: Continues to report no lower back pain this date but is a little short of breath/tired today. Also reports new right sided groin pain that began about two days ago. Aggravated with prolonged walking and alleviated with sitting and resting.   She was compliant with home exercise program.  Response to previous treatment: none  Functional change: better movement    Pain: 0/10  Location: bilateral low back      OBJECTIVE     Objective Measures updated at progress report unless specified.     Lower Extremity Strength  6/24/22: 20 mins    Right Left   Hip flexion: 5/5 5/5   Hip extension: 5/5 5/5   Hip abduction: 5/5 5/5   Hip ER:  5/5 5/5   Hip IR: 5/5 5/5   Knee extension: 5/5 5/5   Knee flexion: 5/5 5/5   Ankle dorsiflexion: 5/5 5/5   Great toe extension: 5/5 5/5     Lumbar range of motion= within normal limits (flexion, extension, side-bend, rotation bilaterally). No pain.    Increased tenderness at left biceps tendon insertion at elbow  Increased pain upon manual muscle testing of left supination.  Positive Left speeds test.     FOTO 6/24: 97%  Modified Oswestry: 0        Treatment     Pradip received the treatments listed below:      Pradip received therapeutic exercises to develop strength and core stabilization for 40 minutes  including:  +2# pronation x 10 reps  +Scapular retractions x 20 reps  +Supine wand flexion x 10 reps  +Standing isometrics (left glenohumeral joint): flexion, extension, abd, internal/external rotation x 10'' (5 reps)    Not today:  Posterior pelvic tilt (PPT)/transversus abdominus (TrA) activation with diaphragmatic breathin min  Hip adduction with ball with PPT, TrA with diaphragmatic breathin min  Hip abduction isometric with PPT, TrA activation with diaphragmatic breathin min  Hip abduction with blue band, hook-lying 3x10 reps  Bridges 3x10 reps    Straight arm pull down sustained with unilateral hip flexion 3x10 reps bilaterally, red band  Straight arm pull down, red band x 30 reps  Rows x 30 reps, red band  Paloff press x 30 reps bilaterally, red band  Sit to stand, chair + airex pad - 3x10 reps    NuStep L4, 8 min    +Walking loops 3.38 minutes 3 laps x150ft ea no rest breaks INCREASE on       Patient Education and Home Exercises     Home Exercises and Patient Education Provided     Education provided re: trial of therapy 1x/wk for 4 weeks; if pain does not return to low back (Due to recent round of steroids), will discharge.     Written Home Exercises Provided: .  Exercises were reviewed and Pradip was able to demonstrate them prior to the end of the session.   Pt received a written copy of exercises to perform at home. Pradip demonstrated good  understanding of the education provided.      See EMR under patient instructions for exercises given.       ASSESSMENT     Pt continues with no pain today, modified oswestry score of 0.0 and FOTO score of 97% function. Today she c/o pain in left forearm/elbow. Special test, strength testing and palpation indicates possible left biceps tendinitis. Gave pt some exercises to improve strength in glenohumeral joint, periscapular muscles, pronator muscles and improve length of biceps to decrease pain. Pt will discharge from PT services re: low back due  to resolution of symptoms.      Pradip Is progressing well towards her goals.   Pt prognosis is Good.     Pt will continue to benefit from skilled outpatient physical therapy to address the deficits listed in the problem list box on initial evaluation, provide pt/family education and to maximize pt's level of independence in the home and community environment.     Pt's spiritual, cultural and educational needs considered and pt agreeable to plan of care and goals.     Anticipated barriers to physical therapy: none    Goals:  Short Term Goals: 3-4 weeks: 6/30/22  1.  Patient will demonstrate compliance and full knowledge of how to perform home exercise program to manage advanced lumbar spondylosis symptoms. Met, 6/10  2.  Patient will perform 5x sit<>stand test within </=12 seconds to denote no fall risk for her age group. Not Met, 6/24  3.  Patient will report </=2/10 pain in low back with standing/ambulating for >/=10 mins with use of assistive device. Met, 6/24  4. Pt will improve FOTO score to >/=58% to denote substantial change since evaluation. Met, 6/10      PLAN   Standing tolerance (goal >10 mins).  Core stabilization, functional strengthening.  pdate home exercise program.    Certification Period/Plan of care expiration: 5/30/2022 to 6/30/22.     Outpatient Physical Therapy 1 times weekly for 4 weeks (trial of therapy- if no pain by end of 4 weeks, discharge with updated home exercise program) to include the following interventions: Gait Training, Manual Therapy, Neuromuscular Re-ed, Therapeutic Activities and Therapeutic Exercise.       Moraima Eng, PT   6/24/2022

## 2022-06-24 ENCOUNTER — CLINICAL SUPPORT (OUTPATIENT)
Dept: REHABILITATION | Facility: HOSPITAL | Age: 87
End: 2022-06-24
Payer: MEDICARE

## 2022-06-24 DIAGNOSIS — R53.1 DECREASED STRENGTH: Primary | ICD-10-CM

## 2022-06-24 PROCEDURE — 97110 THERAPEUTIC EXERCISES: CPT | Mod: PN

## 2022-06-24 PROCEDURE — 97750 PHYSICAL PERFORMANCE TEST: CPT | Mod: PN

## 2022-06-24 NOTE — PLAN OF CARE
[facility]   Physical Therapy Treatment Note /discharge note    Name: Pradip Zelaya  Clinic Number: [id number]      Therapy Diagnosis:   Encounter Diagnosis   Name Primary?    Decreased strength Yes     Physician: Jamshid Cote DO      Visit Date: 6/24/2022      Physician Orders:  PT eval and treat  Medical Diagnosis: M47.816 (ICD-10-CM) - Lumbar spondylosis   Evaluation Date: 5/30/2022  Authorization Period Expiration: 12/31/2022   Plan of Care Certification Period: 7/11/22  Visit # / Visits authorized: 2/ 50 (eval included)    PTA Visit #: 1/5     Time In: 10:00  Time Out: 11:00  Total Billable Time: 60 minutes-- 4 TE    SUBJECTIVE     Pt reports: Continues to report no lower back pain this date but is a little short of breath/tired today. Also reports new right sided groin pain that began about two days ago. Aggravated with prolonged walking and alleviated with sitting and resting.   She was compliant with home exercise program.  Response to previous treatment: none  Functional change: better movement    Pain: 0/10  Location: bilateral low back      OBJECTIVE     Objective Measures updated at progress report unless specified.     Lower Extremity Strength  6/24/22: 20 mins    Right Left   Hip flexion: 5/5 5/5   Hip extension: 5/5 5/5   Hip abduction: 5/5 5/5   Hip ER:  5/5 5/5   Hip IR: 5/5 5/5   Knee extension: 5/5 5/5   Knee flexion: 5/5 5/5   Ankle dorsiflexion: 5/5 5/5   Great toe extension: 5/5 5/5     Lumbar range of motion= within normal limits (flexion, extension, side-bend, rotation bilaterally). No pain.    Increased tenderness at left biceps tendon insertion at elbow  Increased pain upon manual muscle testing of left supination.  Positive Left speeds test.     FOTO 6/24: 97%  Modified Oswestry: 0        Treatment     Pradip received the treatments listed below:      Pradip received therapeutic exercises to develop strength and core stabilization for 40 minutes  including:  +2# pronation x 10 reps  +Scapular retractions x 20 reps  +Supine wand flexion x 10 reps  +Standing isometrics (left glenohumeral joint): flexion, extension, abd, internal/external rotation x 10'' (5 reps)    Not today:  Posterior pelvic tilt (PPT)/transversus abdominus (TrA) activation with diaphragmatic breathin min  Hip adduction with ball with PPT, TrA with diaphragmatic breathin min  Hip abduction isometric with PPT, TrA activation with diaphragmatic breathin min  Hip abduction with blue band, hook-lying 3x10 reps  Bridges 3x10 reps    Straight arm pull down sustained with unilateral hip flexion 3x10 reps bilaterally, red band  Straight arm pull down, red band x 30 reps  Rows x 30 reps, red band  Paloff press x 30 reps bilaterally, red band  Sit to stand, chair + airex pad - 3x10 reps    NuStep L4, 8 min    +Walking loops 3.38 minutes 3 laps x150ft ea no rest breaks INCREASE on       Patient Education and Home Exercises     Home Exercises and Patient Education Provided     Education provided re: trial of therapy 1x/wk for 4 weeks; if pain does not return to low back (Due to recent round of steroids), will discharge.     Written Home Exercises Provided: .  Exercises were reviewed and Pradip was able to demonstrate them prior to the end of the session.   Pt received a written copy of exercises to perform at home. Pradip demonstrated good  understanding of the education provided.      See EMR under patient instructions for exercises given.       ASSESSMENT     Pt continues with no pain today, modified oswestry score of 0.0 and FOTO score of 97% function. Today she c/o pain in left forearm/elbow. Special test, strength testing and palpation indicates possible left biceps tendinitis. Gave pt some exercises to improve strength in glenohumeral joint, periscapular muscles, pronator muscles and improve length of biceps to decrease pain. Pt will discharge from PT services re: low back due  to resolution of symptoms.      Pradip Is progressing well towards her goals.   Pt prognosis is Good.     Pt will continue to benefit from skilled outpatient physical therapy to address the deficits listed in the problem list box on initial evaluation, provide pt/family education and to maximize pt's level of independence in the home and community environment.     Pt's spiritual, cultural and educational needs considered and pt agreeable to plan of care and goals.     Anticipated barriers to physical therapy: none    Goals:  Short Term Goals: 3-4 weeks: 6/30/22  1.  Patient will demonstrate compliance and full knowledge of how to perform home exercise program to manage advanced lumbar spondylosis symptoms. Met, 6/10  2.  Patient will perform 5x sit<>stand test within </=12 seconds to denote no fall risk for her age group. Not Met, 6/24  3.  Patient will report </=2/10 pain in low back with standing/ambulating for >/=10 mins with use of assistive device. Met, 6/24  4. Pt will improve FOTO score to >/=58% to denote substantial change since evaluation. Met, 6/10      PLAN   Standing tolerance (goal >10 mins).  Core stabilization, functional strengthening.  pdate home exercise program.    Certification Period/Plan of care expiration: 5/30/2022 to 6/30/22.     Outpatient Physical Therapy 1 times weekly for 4 weeks (trial of therapy- if no pain by end of 4 weeks, discharge with updated home exercise program) to include the following interventions: Gait Training, Manual Therapy, Neuromuscular Re-ed, Therapeutic Activities and Therapeutic Exercise.       Moraima Eng, PT   6/23/2022

## 2022-06-28 ENCOUNTER — DOCUMENTATION ONLY (OUTPATIENT)
Dept: REHABILITATION | Facility: HOSPITAL | Age: 87
End: 2022-06-28
Payer: MEDICARE

## 2022-06-28 NOTE — PROGRESS NOTES
Face to face meeting completed with Moraima Eng PT regarding current status and progress of   Pradip Zelaya .     Meredith Deutsch, PTA

## 2022-08-17 ENCOUNTER — TELEPHONE (OUTPATIENT)
Dept: INTERNAL MEDICINE | Facility: CLINIC | Age: 87
End: 2022-08-17
Payer: MEDICARE

## 2022-08-17 NOTE — TELEPHONE ENCOUNTER
----- Message from Yuli Harrell sent at 8/17/2022  8:05 AM CDT -----  Regarding: same day  Type:  Same Day Appointment Request    Caller is requesting a same day appointment.  Caller declined first available appointment listed below.    Name of Caller:patient daughter     When is the first available appointment?October     Symptoms:Ear feeling clogged     Best Call Back Number:[phone]  Additional Information: n/a

## 2022-08-17 NOTE — TELEPHONE ENCOUNTER
Patient is on the schedule for amelia on Friday,states she will call back if she needs to change the appt

## 2022-08-29 ENCOUNTER — TELEPHONE (OUTPATIENT)
Dept: INTERNAL MEDICINE | Facility: CLINIC | Age: 87
End: 2022-08-29
Payer: MEDICARE

## 2022-08-29 NOTE — TELEPHONE ENCOUNTER
----- Message from Yuli Harrell sent at 8/29/2022  9:51 AM CDT -----  Regarding: same day  Type:  Same Day Appointment Request    Caller is requesting a same day appointment.  Caller declined first available appointment listed below.    Name of Caller: Patient daughter in law Esperanza    When is the first available appointment?October     Symptoms:ear leakage in right ear     Best Call Back Number:[phone]        Additional Information: n/a

## 2022-09-27 ENCOUNTER — OFFICE VISIT (OUTPATIENT)
Dept: INTERNAL MEDICINE | Facility: CLINIC | Age: 87
End: 2022-09-27
Payer: MEDICARE

## 2022-09-27 ENCOUNTER — LAB VISIT (OUTPATIENT)
Dept: LAB | Facility: HOSPITAL | Age: 87
End: 2022-09-27
Attending: INTERNAL MEDICINE
Payer: MEDICARE

## 2022-09-27 VITALS
OXYGEN SATURATION: 97 % | BODY MASS INDEX: 28.36 KG/M2 | HEART RATE: 66 BPM | WEIGHT: 154.13 LBS | SYSTOLIC BLOOD PRESSURE: 122 MMHG | DIASTOLIC BLOOD PRESSURE: 76 MMHG | TEMPERATURE: 98 F

## 2022-09-27 DIAGNOSIS — I12.9 HYPERTENSIVE KIDNEY DISEASE WITH STAGE 3A CHRONIC KIDNEY DISEASE: ICD-10-CM

## 2022-09-27 DIAGNOSIS — M25.512 CHRONIC LEFT SHOULDER PAIN: ICD-10-CM

## 2022-09-27 DIAGNOSIS — J44.9 MILD CHRONIC OBSTRUCTIVE PULMONARY DISEASE: ICD-10-CM

## 2022-09-27 DIAGNOSIS — I70.0 ATHEROSCLEROSIS OF AORTA: ICD-10-CM

## 2022-09-27 DIAGNOSIS — I10 ESSENTIAL HYPERTENSION: ICD-10-CM

## 2022-09-27 DIAGNOSIS — Z23 NEED FOR INFLUENZA VACCINATION: ICD-10-CM

## 2022-09-27 DIAGNOSIS — R73.03 PREDIABETES: Primary | ICD-10-CM

## 2022-09-27 DIAGNOSIS — E78.2 MIXED HYPERLIPIDEMIA: ICD-10-CM

## 2022-09-27 DIAGNOSIS — N18.31 HYPERTENSIVE KIDNEY DISEASE WITH STAGE 3A CHRONIC KIDNEY DISEASE: ICD-10-CM

## 2022-09-27 DIAGNOSIS — N32.81 OVERACTIVE BLADDER: ICD-10-CM

## 2022-09-27 DIAGNOSIS — E03.9 ACQUIRED HYPOTHYROIDISM: ICD-10-CM

## 2022-09-27 DIAGNOSIS — G89.29 CHRONIC LEFT SHOULDER PAIN: ICD-10-CM

## 2022-09-27 DIAGNOSIS — R73.03 PREDIABETES: ICD-10-CM

## 2022-09-27 PROBLEM — R53.1 DECREASED STRENGTH: Status: RESOLVED | Noted: 2022-06-10 | Resolved: 2022-09-27

## 2022-09-27 LAB
ALBUMIN SERPL BCP-MCNC: 4.2 G/DL (ref 3.5–5.2)
ALP SERPL-CCNC: 76 U/L (ref 55–135)
ALT SERPL W/O P-5'-P-CCNC: 18 U/L (ref 10–44)
ANION GAP SERPL CALC-SCNC: 14 MMOL/L (ref 8–16)
AST SERPL-CCNC: 25 U/L (ref 10–40)
BILIRUB SERPL-MCNC: 0.9 MG/DL (ref 0.1–1)
BUN SERPL-MCNC: 21 MG/DL (ref 10–30)
CALCIUM SERPL-MCNC: 10.5 MG/DL (ref 8.7–10.5)
CHLORIDE SERPL-SCNC: 100 MMOL/L (ref 95–110)
CO2 SERPL-SCNC: 27 MMOL/L (ref 23–29)
CREAT SERPL-MCNC: 1 MG/DL (ref 0.5–1.4)
EST. GFR  (NO RACE VARIABLE): 51.9 ML/MIN/1.73 M^2
ESTIMATED AVG GLUCOSE: 137 MG/DL (ref 68–131)
GLUCOSE SERPL-MCNC: 117 MG/DL (ref 70–110)
HBA1C MFR BLD: 6.4 % (ref 4–5.6)
POTASSIUM SERPL-SCNC: 3.6 MMOL/L (ref 3.5–5.1)
PROT SERPL-MCNC: 7.1 G/DL (ref 6–8.4)
SODIUM SERPL-SCNC: 141 MMOL/L (ref 136–145)
T4 FREE SERPL-MCNC: 1.07 NG/DL (ref 0.71–1.51)
TSH SERPL DL<=0.005 MIU/L-ACNC: 5.23 UIU/ML (ref 0.4–4)

## 2022-09-27 PROCEDURE — 99214 PR OFFICE/OUTPT VISIT, EST, LEVL IV, 30-39 MIN: ICD-10-PCS | Mod: S$PBB,,, | Performed by: INTERNAL MEDICINE

## 2022-09-27 PROCEDURE — G0008 ADMIN INFLUENZA VIRUS VAC: HCPCS | Mod: PBBFAC

## 2022-09-27 PROCEDURE — 84443 ASSAY THYROID STIM HORMONE: CPT | Performed by: INTERNAL MEDICINE

## 2022-09-27 PROCEDURE — 80053 COMPREHEN METABOLIC PANEL: CPT | Performed by: INTERNAL MEDICINE

## 2022-09-27 PROCEDURE — 99999 PR PBB SHADOW E&M-EST. PATIENT-LVL IV: CPT | Mod: PBBFAC,,, | Performed by: INTERNAL MEDICINE

## 2022-09-27 PROCEDURE — 99214 OFFICE O/P EST MOD 30 MIN: CPT | Mod: S$PBB,,, | Performed by: INTERNAL MEDICINE

## 2022-09-27 PROCEDURE — 99214 OFFICE O/P EST MOD 30 MIN: CPT | Mod: PBBFAC,25 | Performed by: INTERNAL MEDICINE

## 2022-09-27 PROCEDURE — 99999 PR PBB SHADOW E&M-EST. PATIENT-LVL IV: ICD-10-PCS | Mod: PBBFAC,,, | Performed by: INTERNAL MEDICINE

## 2022-09-27 PROCEDURE — 83036 HEMOGLOBIN GLYCOSYLATED A1C: CPT | Performed by: INTERNAL MEDICINE

## 2022-09-27 PROCEDURE — 84439 ASSAY OF FREE THYROXINE: CPT | Performed by: INTERNAL MEDICINE

## 2022-09-27 PROCEDURE — 36415 COLL VENOUS BLD VENIPUNCTURE: CPT | Performed by: INTERNAL MEDICINE

## 2022-09-27 NOTE — ASSESSMENT & PLAN NOTE
Stable with albuterol inhaler and nebs PRN. Breo caused hoarseness in past.  Does well.  No SOB/wheezing.  Uses inhaler in AM usually, occasionally in the afternoon but not every day.

## 2022-09-27 NOTE — PROGRESS NOTES
[facility] Note    Chief Complaint      Chief Complaint   Patient presents with    Follow-up       History of Present Illness      Pradip Zelaya is a 89 y.o. female who presents today for follow up of HTN.  Patient comes to appointment with child.    Fell in parking lot on 7/21/22. Went to  ED, had CT of left shoulder due to decreased ROM.  Seen by Dr. Stout, healed on its own.  Now has full range of motion.    Problem List Items Addressed This Visit       Hypothyroidism    Current Assessment & Plan     Stable on synthroid 112 mcg daily, no S/S of hypo/hyperthyroidism.         Relevant Orders    Comprehensive Metabolic Panel    TSH    T4, FREE    Mild chronic obstructive pulmonary disease    Current Assessment & Plan     Stable with albuterol inhaler and nebs PRN. Breo caused hoarseness in past.  Does well.  No SOB/wheezing.  Uses inhaler in AM usually, occasionally in the afternoon but not every day.         Mixed hyperlipidemia    Current Assessment & Plan     Not on Rx meds, takes niacin.    The ASCVD Risk score (Francine DK, et al., 2019) failed to calculate for the following reasons:    The 2019 ASCVD risk score is only valid for ages 40 to 79           Overactive bladder    Current Assessment & Plan     Stable on ditropan 5 mg daily.  Works well for her.  Gets up twice at night to use the restroom.         Essential hypertension    Current Assessment & Plan     BP controlled on HCTZ 25 mg daily, no SOB/CP/HA.  Has been exercising as much as she can around the house.           Atherosclerosis of aorta    Current Assessment & Plan     On ASA.         Hypertensive kidney disease with stage 3a chronic kidney disease    Current Assessment & Plan     Labs stable in 3/2022.  GFR 54.         Prediabetes - Primary    Current Assessment & Plan     A1C 6.6 in 3/2022, worsened from prior.         Relevant Orders    Comprehensive Metabolic Panel    Hemoglobin A1C     Other Visit Diagnoses       Chronic  left shoulder pain        Need for influenza vaccination        Relevant Orders    Influenza (FLUAD) - Quadrivalent (Adjuvanted) *Preferred* (65+) (PF)            Health Maintenance   Topic Date Due    TETANUS VACCINE  Never done    Lipid Panel  2027       Past Medical History:   Diagnosis Date    Senile osteoporosis 2020       Past Surgical History:   Procedure Laterality Date    ADENOIDECTOMY      EYE SURGERY      TONSILLECTOMY         family history includes Cancer in her son; Hearing loss in her father and paternal grandfather; Heart disease in her mother; Hypertension in her father; Stroke in her father.    Social History     Tobacco Use    Smoking status: Former     Packs/day: 0.25     Years: 10.00     Pack years: 2.50     Types: Cigarettes     Start date: 1943     Quit date: 1953     Years since quittin.4    Smokeless tobacco: Never    Tobacco comments:     Extremely light usage.   Substance Use Topics    Alcohol use: Not Currently    Drug use: Never       Review of Systems   Constitutional:  Negative for chills and fever.   HENT:  Negative for sore throat.    Respiratory:  Negative for cough and shortness of breath.    Cardiovascular:  Negative for chest pain and palpitations.   Gastrointestinal:  Negative for constipation, diarrhea, nausea and vomiting.   Genitourinary:  Negative for dysuria and hematuria.   Musculoskeletal:  Negative for falls.   Neurological:  Negative for headaches.      Outpatient Encounter Medications as of 2022   Medication Sig Dispense Refill    albuterol (PROAIR HFA) 90 mcg/actuation inhaler Inhale 2 puffs into the lungs every 6 (six) hours as needed for Wheezing. Rescue 18 g 5    ascorbic acid, vitamin C, (VITAMIN C) 500 MG tablet Take 500 mg by mouth once daily.      aspirin-calcium carbonate 81 mg-300 mg calcium(777 mg) Tab Take 81 mg by mouth once daily.      coenzyme Q10 100 mg capsule Take 100 mg by mouth once daily.      folic  acid/multivit-min/lutein (CENTRUM SILVER ORAL) Take 1 capsule by mouth once daily.      glucosamine-chondroitin 500-400 mg tablet Take 1 tablet by mouth once daily.      hydroCHLOROthiazide (HYDRODIURIL) 25 MG tablet Take 1 tablet (25 mg total) by mouth once daily. 90 tablet 3    levothyroxine (SYNTHROID) 100 MCG tablet Take 1 tablet (100 mcg total) by mouth once daily. 90 tablet 3    niacin 500 MG CpSR Take 250 mg by mouth every evening.      oxybutynin (DITROPAN-XL) 5 MG TR24 Take 1 tablet (5 mg total) by mouth once daily. 90 tablet 3     No facility-administered encounter medications on file as of 9/27/2022.        Review of patient's allergies indicates:   Allergen Reactions    Bactrim [sulfamethoxazole-trimethoprim]     Ciprofloxacin        Physical Exam      Vital Signs  Temp: 97.9 °F (36.6 °C)  Pulse: 66  SpO2: 97 %  BP: 122/76  Pain Score: 0-No pain  Height and Weight  Weight: 69.9 kg (154 lb 1.6 oz)]    Physical Exam  Constitutional:       Appearance: She is well-developed.   HENT:      Head: Normocephalic and atraumatic.      Right Ear: External ear normal.      Left Ear: External ear normal.   Eyes:      General:         Right eye: No discharge.         Left eye: No discharge.   Cardiovascular:      Rate and Rhythm: Normal rate and regular rhythm.      Heart sounds: Normal heart sounds. No murmur heard.  Pulmonary:      Effort: Pulmonary effort is normal. No respiratory distress.      Breath sounds: Normal breath sounds.   Abdominal:      General: There is no distension.      Palpations: Abdomen is soft.      Tenderness: There is no abdominal tenderness. There is no guarding.   Musculoskeletal:         General: Normal range of motion.      Cervical back: Normal range of motion.   Skin:     General: Skin is warm and dry.   Neurological:      Mental Status: She is alert and oriented to person, place, and time.   Psychiatric:         Behavior: Behavior normal.        Laboratory:  CBC:  No results for  input(s): WBC, RBC, HGB, HCT, PLT, MCV, MCH, MCHC in the last 2160 hours.  CMP:  No results for input(s): GLU, CALCIUM, ALBUMIN, PROT, NA, K, CO2, CL, BUN, ALKPHOS, ALT, AST, BILITOT in the last 2160 hours.    Invalid input(s): CREATININ  URINALYSIS:  No results for input(s): COLORU, CLARITYU, SPECGRAV, PHUR, PROTEINUA, GLUCOSEU, BILIRUBINCON, BLOODU, WBCU, RBCU, BACTERIA, MUCUS, NITRITE, LEUKOCYTESUR, UROBILINOGEN, HYALINECASTS in the last 2160 hours.   LIPIDS:  No results for input(s): TSH, HDL, CHOL, TRIG, LDLCALC, CHOLHDL, NONHDLCHOL, TOTALCHOLEST in the last 2160 hours.  TSH:  No results for input(s): TSH in the last 2160 hours.  A1C:  No results for input(s): HGBA1C in the last 2160 hours.    Radiology:  No results found in the last 30 days.     Assessment/Plan     Pradip Zelaya is a 89 y.o.female with:    1. Prediabetes  - Comprehensive Metabolic Panel; Future  - Hemoglobin A1C; Future    2. Acquired hypothyroidism  - Comprehensive Metabolic Panel; Future  - TSH; Future  - T4, FREE; Future    3. Essential hypertension    4. Atherosclerosis of aorta    5. Mild chronic obstructive pulmonary disease    6. Mixed hyperlipidemia    7. Hypertensive kidney disease with stage 3a chronic kidney disease    8. Overactive bladder    9. Chronic left shoulder pain    10. Need for influenza vaccination  - Influenza (FLUAD) - Quadrivalent (Adjuvanted) *Preferred* (65+) (PF)    -labs ordered  -counseled on COVID booster, will give flu shot today  -Continue current medications and maintain follow up with specialists.    -No follow-ups on file.       Herminia Patiño MD  [facility], [place]

## 2022-09-27 NOTE — ASSESSMENT & PLAN NOTE
Not on Rx meds, takes niacin.    The ASCVD Risk score (Francine MACKAY, et al., 2019) failed to calculate for the following reasons:    The 2019 ASCVD risk score is only valid for ages 40 to 79

## 2022-10-03 DIAGNOSIS — Z71.89 COMPLEX CARE COORDINATION: ICD-10-CM

## 2022-10-30 ENCOUNTER — PATIENT MESSAGE (OUTPATIENT)
Dept: INTERNAL MEDICINE | Facility: CLINIC | Age: 87
End: 2022-10-30
Payer: MEDICARE

## 2022-10-30 DIAGNOSIS — S32.82XD MULTIPLE CLOSED FRACTURES OF PELVIS WITHOUT DISRUPTION OF PELVIC RING WITH ROUTINE HEALING, SUBSEQUENT ENCOUNTER: Primary | ICD-10-CM

## 2022-10-31 ENCOUNTER — PATIENT MESSAGE (OUTPATIENT)
Dept: INTERNAL MEDICINE | Facility: CLINIC | Age: 87
End: 2022-10-31
Payer: MEDICARE

## 2022-11-01 PROCEDURE — G0180 MD CERTIFICATION HHA PATIENT: HCPCS | Mod: ,,, | Performed by: INTERNAL MEDICINE

## 2022-11-01 PROCEDURE — G0180 PR HOME HEALTH MD CERTIFICATION: ICD-10-PCS | Mod: ,,, | Performed by: INTERNAL MEDICINE

## 2022-11-03 RX ORDER — HYDROCODONE BITARTRATE AND ACETAMINOPHEN 5; 325 MG/1; MG/1
1 TABLET ORAL EVERY 8 HOURS PRN
Qty: 42 TABLET | Refills: 0 | Status: SHIPPED | OUTPATIENT
Start: 2022-11-03 | End: 2022-11-21 | Stop reason: SDUPTHER

## 2022-11-16 ENCOUNTER — TELEPHONE (OUTPATIENT)
Dept: INTERNAL MEDICINE | Facility: CLINIC | Age: 87
End: 2022-11-16
Payer: MEDICARE

## 2022-11-16 NOTE — TELEPHONE ENCOUNTER
"Sent pt schedule message, I had her scheduled to come in Friday at 9am.   Per daughter "I have cancelled the appt for Friday.  The therapist that came yesterday said she did not think that the swelling was anything to worry about at this time.   She is weighing daily to keep track of any significant weight gain. Any tips you could give us to help the swelling would be appreciated.  She is still unable to lie in a bed.   She is getting around walking really well but is still unable to move the right leg from the hip without a lot of pain.   We are still unable to get her into a car easily and I do not know how she could get to the office.  Is telehealth an option?  We could do that on Friday at 9.  Thanks for all of your help.  Esperanza Zelaya"  "

## 2022-11-18 ENCOUNTER — OFFICE VISIT (OUTPATIENT)
Dept: INTERNAL MEDICINE | Facility: CLINIC | Age: 87
End: 2022-11-18
Payer: MEDICARE

## 2022-11-18 DIAGNOSIS — S32.82XD MULTIPLE CLOSED FRACTURES OF PELVIS WITHOUT DISRUPTION OF PELVIC RING WITH ROUTINE HEALING, SUBSEQUENT ENCOUNTER: Primary | ICD-10-CM

## 2022-11-18 PROCEDURE — 99214 PR OFFICE/OUTPT VISIT, EST, LEVL IV, 30-39 MIN: ICD-10-PCS | Mod: 95,,, | Performed by: INTERNAL MEDICINE

## 2022-11-18 PROCEDURE — 99214 OFFICE O/P EST MOD 30 MIN: CPT | Mod: 95,,, | Performed by: INTERNAL MEDICINE

## 2022-11-18 NOTE — PROGRESS NOTES
The patient location is: home  The chief complaint leading to consultation is: hospital DC follow up    Visit type: audiovisual    Face to Face time with patient: 10 minutes  10 minutes of total time spent on the encounter, which includes face to face time and non-face to face time preparing to see the patient (eg, review of tests), Obtaining and/or reviewing separately obtained history, Documenting clinical information in the electronic or other health record, Independently interpreting results (not separately reported) and communicating results to the patient/family/caregiver, or Care coordination (not separately reported).     Each patient to whom he or she provides medical services by telemedicine is:  (1) informed of the relationship between the physician and patient and the respective role of any other health care provider with respect to management of the patient; and (2) notified that he or she may decline to receive medical services by telemedicine and may withdraw from such care at any time.    [facility] Note    Chief Complaint      No chief complaint on file.    History of Present Illness      Pradip Zelaya is a 89 y.o. female who presents today for follow up of HTN.  Patient comes to appointment with child.    Fell in her home on 10/26/22, admitted at  10/30. Found to have right inferior and superior pubic rami fractures and sacral alar fracture.  Nonsurgical, D/C'ed on pain meds. No ortho f/u.    PT coming twice per week, OT/PT coming twice per week. Taking pain pill before working PT.  Takes pill usually 1-2 times per day.  Sleeping in recliner, too hard to lift legs to get in hospital bed.  Using walker to walk right now, doing well with walking.    Problem List Items Addressed This Visit    None  Visit Diagnoses       Multiple closed fractures of pelvis without disruption of pelvic ring with routine healing, subsequent encounter    -  Primary            Health Maintenance    Topic Date Due    TETANUS VACCINE  Never done    Lipid Panel  2027       Past Medical History:   Diagnosis Date    Senile osteoporosis 2020       Past Surgical History:   Procedure Laterality Date    ADENOIDECTOMY      EYE SURGERY      TONSILLECTOMY         family history includes Cancer in her son; Hearing loss in her father and paternal grandfather; Heart disease in her mother; Hypertension in her father; Stroke in her father.    Social History     Tobacco Use    Smoking status: Former     Packs/day: 0.25     Years: 10.00     Pack years: 2.50     Types: Cigarettes     Start date: 1943     Quit date: 1953     Years since quittin.5    Smokeless tobacco: Never    Tobacco comments:     Extremely light usage.   Substance Use Topics    Alcohol use: Not Currently    Drug use: Never       Review of Systems   Constitutional:  Negative for chills and fever.   HENT:  Positive for hearing loss. Negative for sore throat.    Eyes:  Negative for discharge.   Respiratory:  Negative for cough, shortness of breath and wheezing.    Cardiovascular:  Negative for chest pain and palpitations.   Gastrointestinal:  Negative for blood in stool, constipation, diarrhea, nausea and vomiting.   Genitourinary:  Negative for dysuria and hematuria.   Musculoskeletal:  Negative for falls and neck pain.   Neurological:  Negative for weakness and headaches.   Endo/Heme/Allergies:  Negative for polydipsia.      Outpatient Encounter Medications as of 2022   Medication Sig Dispense Refill    albuterol (PROAIR HFA) 90 mcg/actuation inhaler Inhale 2 puffs into the lungs every 6 (six) hours as needed for Wheezing. Rescue 18 g 5    ascorbic acid, vitamin C, (VITAMIN C) 500 MG tablet Take 500 mg by mouth once daily.      aspirin-calcium carbonate 81 mg-300 mg calcium(777 mg) Tab Take 81 mg by mouth once daily.      coenzyme Q10 100 mg capsule Take 100 mg by mouth once daily.      folic acid/multivit-min/lutein (CENTRUM  SILVER ORAL) Take 1 capsule by mouth once daily.      glucosamine-chondroitin 500-400 mg tablet Take 1 tablet by mouth once daily.      hydroCHLOROthiazide (HYDRODIURIL) 25 MG tablet Take 1 tablet (25 mg total) by mouth once daily. 90 tablet 3    HYDROcodone-acetaminophen (NORCO) 5-325 mg per tablet Take 1 tablet by mouth every 8 (eight) hours as needed for Pain. 42 tablet 0    levothyroxine (SYNTHROID) 100 MCG tablet Take 1 tablet (100 mcg total) by mouth once daily. 90 tablet 3    niacin 500 MG CpSR Take 250 mg by mouth every evening.      oxybutynin (DITROPAN-XL) 5 MG TR24 Take 1 tablet (5 mg total) by mouth once daily. 90 tablet 3     No facility-administered encounter medications on file as of 11/18/2022.        Review of patient's allergies indicates:   Allergen Reactions    Bactrim [sulfamethoxazole-trimethoprim]     Ciprofloxacin        Physical Exam       ]    Physical Exam  Constitutional:       Appearance: She is well-developed.   HENT:      Head: Normocephalic and atraumatic.      Right Ear: External ear normal.      Left Ear: External ear normal.   Eyes:      General:         Right eye: No discharge.         Left eye: No discharge.   Cardiovascular:      Rate and Rhythm: Normal rate and regular rhythm.      Heart sounds: Normal heart sounds. No murmur heard.  Pulmonary:      Effort: Pulmonary effort is normal. No respiratory distress.      Breath sounds: Normal breath sounds.   Abdominal:      General: There is no distension.      Palpations: Abdomen is soft.      Tenderness: There is no abdominal tenderness. There is no guarding.   Musculoskeletal:         General: Normal range of motion.      Cervical back: Normal range of motion.   Skin:     General: Skin is warm and dry.   Neurological:      Mental Status: She is alert and oriented to person, place, and time.   Psychiatric:         Behavior: Behavior normal.        Laboratory:  CBC:  No results for input(s): WBC, RBC, HGB, HCT, PLT, MCV, MCH, MCHC  in the last 2160 hours.  CMP:  Recent Labs   Lab Result Units 09/27/22  0831   Glucose mg/dL 117*   Calcium mg/dL 10.5   Albumin g/dL 4.2   Total Protein g/dL 7.1   Sodium mmol/L 141   Potassium mmol/L 3.6   CO2 mmol/L 27   Chloride mmol/L 100   BUN mg/dL 21   Alkaline Phosphatase U/L 76   ALT U/L 18   AST U/L 25   Total Bilirubin mg/dL 0.9     URINALYSIS:  No results for input(s): COLORU, CLARITYU, SPECGRAV, PHUR, PROTEINUA, GLUCOSEU, BILIRUBINCON, BLOODU, WBCU, RBCU, BACTERIA, MUCUS, NITRITE, LEUKOCYTESUR, UROBILINOGEN, HYALINECASTS in the last 2160 hours.   LIPIDS:  Recent Labs   Lab Result Units 09/27/22  0831   TSH uIU/mL 5.231*     TSH:  Recent Labs   Lab Result Units 09/27/22  0831   TSH uIU/mL 5.231*     A1C:  Recent Labs   Lab Result Units 09/27/22  0831   Hemoglobin A1C % 6.4*       Radiology:  No results found in the last 30 days.     Assessment/Plan     Pradip Zelaya is a 89 y.o.female with:    1. Multiple closed fractures of pelvis without disruption of pelvic ring with routine healing, subsequent encounter    -counseled on compression stockings, continue PT  -ok with refilling with pain medication  -Continue current medications and maintain follow up with specialists.    -Follow up if symptoms worsen or fail to improve.       Herminia Patiño MD  [facility]      Answers submitted by the patient for this visit:  Review of Systems Questionnaire (Submitted on 11/16/2022)  activity change: Yes  unexpected weight change: No  rhinorrhea: No  trouble swallowing: No  visual disturbance: No  chest tightness: No  polyuria: No  difficulty urinating: No  menstrual problem: No  joint swelling: No  arthralgias: No  confusion: No  dysphoric mood: No

## 2022-11-21 ENCOUNTER — DOCUMENT SCAN (OUTPATIENT)
Dept: HOME HEALTH SERVICES | Facility: HOSPITAL | Age: 87
End: 2022-11-21
Payer: MEDICARE

## 2022-11-23 ENCOUNTER — EXTERNAL HOME HEALTH (OUTPATIENT)
Dept: HOME HEALTH SERVICES | Facility: HOSPITAL | Age: 87
End: 2022-11-23
Payer: MEDICARE

## 2022-12-08 ENCOUNTER — TELEPHONE (OUTPATIENT)
Dept: INTERNAL MEDICINE | Facility: CLINIC | Age: 87
End: 2022-12-08
Payer: MEDICARE

## 2022-12-08 NOTE — TELEPHONE ENCOUNTER
----- Message from Melba Bates sent at 12/8/2022  2:17 PM CST -----  Regarding: order  Contact: [facility] [phone]  Type: Home Health (orders, updates, clarifications, etc.)    Home Health Agency/Nurse:[facility] [phone]    Phone number:[facility] [phone]    Reason for call: Requesting to extend OT for 1 time a week for 2 weeks    Comments:

## 2022-12-08 NOTE — TELEPHONE ENCOUNTER
----- Message from Melody Medrano sent at 12/8/2022  1:01 PM CST -----  Contact: [phone]  Karen with [facility] called to request an order for a standard wheel chair with foot rest for the pt. Please Advise

## 2022-12-19 ENCOUNTER — TELEPHONE (OUTPATIENT)
Dept: INTERNAL MEDICINE | Facility: CLINIC | Age: 87
End: 2022-12-19
Payer: MEDICARE

## 2022-12-19 ENCOUNTER — PATIENT MESSAGE (OUTPATIENT)
Dept: INTERNAL MEDICINE | Facility: CLINIC | Age: 87
End: 2022-12-19
Payer: MEDICARE

## 2022-12-19 NOTE — TELEPHONE ENCOUNTER
----- Message from Page Marion sent at 12/19/2022  9:50 AM CST -----  Contact: Pts Daughter in Law Ms. Mata called Mobile# [phone]  Caller is requesting an earlier appointment then we can schedule.  Caller is requesting a message be sent to the provider.  If this is for urgent care symptoms, did you offer other providers at this location, providers at other locations, or [facility]? (yes, no, n/a):  N/A  If this is for the patients physical, did you offer to schedule next available and put on wait list, or to see NP or PA for their physical?  (yes, no, n/a):  N/A  When is the next available appointment with their provider:  12/20/2022.  Reason for the appointment:  [facility]  Patient preference of timeframe to be scheduled:  12/21/2022, 12/22/2022.  Would the patient like a call back, or a response through their [facility] portal?:   Either

## 2022-12-21 ENCOUNTER — OFFICE VISIT (OUTPATIENT)
Dept: INTERNAL MEDICINE | Facility: CLINIC | Age: 87
End: 2022-12-21
Payer: MEDICARE

## 2022-12-21 ENCOUNTER — LAB VISIT (OUTPATIENT)
Dept: LAB | Facility: HOSPITAL | Age: 87
End: 2022-12-21
Attending: INTERNAL MEDICINE
Payer: MEDICARE

## 2022-12-21 VITALS
DIASTOLIC BLOOD PRESSURE: 84 MMHG | OXYGEN SATURATION: 96 % | HEART RATE: 78 BPM | SYSTOLIC BLOOD PRESSURE: 128 MMHG | WEIGHT: 149.56 LBS | TEMPERATURE: 98 F | BODY MASS INDEX: 27.53 KG/M2

## 2022-12-21 DIAGNOSIS — E87.1 HYPONATREMIA: ICD-10-CM

## 2022-12-21 DIAGNOSIS — N18.31 HYPERTENSIVE KIDNEY DISEASE WITH STAGE 3A CHRONIC KIDNEY DISEASE: ICD-10-CM

## 2022-12-21 DIAGNOSIS — M79.89 LEG SWELLING: ICD-10-CM

## 2022-12-21 DIAGNOSIS — I12.9 HYPERTENSIVE KIDNEY DISEASE WITH STAGE 3A CHRONIC KIDNEY DISEASE: ICD-10-CM

## 2022-12-21 DIAGNOSIS — I10 ESSENTIAL HYPERTENSION: Primary | ICD-10-CM

## 2022-12-21 LAB
ANION GAP SERPL CALC-SCNC: 13 MMOL/L (ref 8–16)
BUN SERPL-MCNC: 16 MG/DL (ref 10–30)
CALCIUM SERPL-MCNC: 9.7 MG/DL (ref 8.7–10.5)
CHLORIDE SERPL-SCNC: 98 MMOL/L (ref 95–110)
CO2 SERPL-SCNC: 25 MMOL/L (ref 23–29)
CREAT SERPL-MCNC: 0.8 MG/DL (ref 0.5–1.4)
EST. GFR  (NO RACE VARIABLE): >60 ML/MIN/1.73 M^2
GLUCOSE SERPL-MCNC: 113 MG/DL (ref 70–110)
POTASSIUM SERPL-SCNC: 4.7 MMOL/L (ref 3.5–5.1)
SODIUM SERPL-SCNC: 136 MMOL/L (ref 136–145)

## 2022-12-21 PROCEDURE — 99999 PR PBB SHADOW E&M-EST. PATIENT-LVL III: CPT | Mod: PBBFAC,,, | Performed by: INTERNAL MEDICINE

## 2022-12-21 PROCEDURE — 80048 BASIC METABOLIC PNL TOTAL CA: CPT | Performed by: INTERNAL MEDICINE

## 2022-12-21 PROCEDURE — 99213 OFFICE O/P EST LOW 20 MIN: CPT | Mod: PBBFAC | Performed by: INTERNAL MEDICINE

## 2022-12-21 PROCEDURE — 99999 PR PBB SHADOW E&M-EST. PATIENT-LVL III: ICD-10-PCS | Mod: PBBFAC,,, | Performed by: INTERNAL MEDICINE

## 2022-12-21 PROCEDURE — 36415 COLL VENOUS BLD VENIPUNCTURE: CPT | Performed by: INTERNAL MEDICINE

## 2022-12-21 PROCEDURE — 99214 PR OFFICE/OUTPT VISIT, EST, LEVL IV, 30-39 MIN: ICD-10-PCS | Mod: S$PBB,,, | Performed by: INTERNAL MEDICINE

## 2022-12-21 PROCEDURE — 99214 OFFICE O/P EST MOD 30 MIN: CPT | Mod: S$PBB,,, | Performed by: INTERNAL MEDICINE

## 2022-12-21 RX ORDER — FUROSEMIDE 20 MG/1
20 TABLET ORAL DAILY PRN
Qty: 30 TABLET | Refills: 1 | Status: SHIPPED | OUTPATIENT
Start: 2022-12-21 | End: 2024-03-26

## 2022-12-21 RX ORDER — POTASSIUM CHLORIDE 750 MG/1
10 TABLET, EXTENDED RELEASE ORAL 2 TIMES DAILY
COMMUNITY

## 2022-12-21 NOTE — PROGRESS NOTES
[facility]    Chief Complaint      Chief Complaint   Patient presents with    Hospital Follow Up       History of Present Illness      Pradip Zelaya is a 89 y.o. female who presents today for follow up of HTN.  Patient comes to appointment with child.    Admitted at   with cough/congestion/dizziness.  Hyponatremia/hypokalemia in ED, HCTZ held and sodium level improved at DC.  Had reaction (hallucinations/confusion) to prednisone after DC, stopped med and no issues since.    Problem List Items Addressed This Visit       Essential hypertension - Primary    Current Assessment & Plan     BP controlled today on no meds, no SOB/CP/HA.  Has been exercising as much as she can around the house.           Hypertensive kidney disease with stage 3a chronic kidney disease    Relevant Orders    BASIC METABOLIC PANEL     Other Visit Diagnoses       Hyponatremia        Leg swelling        Relevant Medications    furosemide (LASIX) 20 MG tablet              Health Maintenance   Topic Date Due    TETANUS VACCINE  Never done    Lipid Panel  2027       Past Medical History:   Diagnosis Date    Senile osteoporosis 2020       Past Surgical History:   Procedure Laterality Date    ADENOIDECTOMY      EYE SURGERY      TONSILLECTOMY         family history includes Cancer in her son; Hearing loss in her father and paternal grandfather; Heart disease in her mother; Hypertension in her father; Stroke in her father.    Social History     Tobacco Use    Smoking status: Former     Packs/day: 0.25     Years: 10.00     Pack years: 2.50     Types: Cigarettes     Start date: 1943     Quit date: 1953     Years since quittin.6    Smokeless tobacco: Never    Tobacco comments:     Extremely light usage.   Substance Use Topics    Alcohol use: Not Currently    Drug use: Never       Review of Systems   Constitutional:  Negative for chills and fever.   HENT:  Positive for hearing loss. Negative for  sore throat.    Eyes:  Negative for discharge.   Respiratory:  Negative for cough, shortness of breath and wheezing.    Cardiovascular:  Negative for chest pain and palpitations.   Gastrointestinal:  Negative for blood in stool, constipation, diarrhea, nausea and vomiting.   Genitourinary:  Negative for dysuria and hematuria.   Musculoskeletal:  Negative for falls and neck pain.   Neurological:  Negative for weakness and headaches.   Endo/Heme/Allergies:  Negative for polydipsia.      Outpatient Encounter Medications as of 12/21/2022   Medication Sig Dispense Refill    albuterol (PROAIR HFA) 90 mcg/actuation inhaler Inhale 2 puffs into the lungs every 6 (six) hours as needed for Wheezing. Rescue 18 g 5    ascorbic acid, vitamin C, (VITAMIN C) 500 MG tablet Take 500 mg by mouth once daily.      aspirin-calcium carbonate 81 mg-300 mg calcium(777 mg) Tab Take 81 mg by mouth once daily.      coenzyme Q10 100 mg capsule Take 100 mg by mouth once daily.      folic acid/multivit-min/lutein (CENTRUM SILVER ORAL) Take 1 capsule by mouth once daily.      glucosamine-chondroitin 500-400 mg tablet Take 1 tablet by mouth once daily.      levothyroxine (SYNTHROID) 100 MCG tablet Take 1 tablet (100 mcg total) by mouth once daily. 90 tablet 3    niacin 500 MG CpSR Take 250 mg by mouth every evening.      oxybutynin (DITROPAN-XL) 5 MG TR24 Take 1 tablet (5 mg total) by mouth once daily. 90 tablet 3    potassium chloride SA (K-DUR,KLOR-CON M) 10 MEQ tablet Take 10 mEq by mouth 2 (two) times daily.      sodium chloride 1 gram tablet Take 1 g by mouth 3 (three) times daily.      furosemide (LASIX) 20 MG tablet Take 1 tablet (20 mg total) by mouth daily as needed (leg swelling). 30 tablet 1    [DISCONTINUED] hydroCHLOROthiazide (HYDRODIURIL) 25 MG tablet Take 1 tablet (25 mg total) by mouth once daily. (Patient not taking: Reported on 12/21/2022) 90 tablet 3    [DISCONTINUED] HYDROcodone-acetaminophen (NORCO) 5-325 mg per tablet Take 1  tablet by mouth every 8 (eight) hours as needed for Pain. 42 tablet 0     Facility-Administered Encounter Medications as of 12/21/2022   Medication Dose Route Frequency Provider Last Rate Last Admin    [DISCONTINUED] albuterol-ipratropium 2.5 mg-0.5 mg/3 mL nebulizer solution  3 mL Inhalation Q4H PRN Generic External Data Provider        [DISCONTINUED] benzonatate capsule  100 mg Oral TID PRN Generic External Data Provider        [DISCONTINUED] cefTRIAXone injection  1 g Intravenous  Generic External Data Provider        [DISCONTINUED] GENERIC EXTERNAL MEDICATION  1 application Nasal  Generic External Data Provider        [DISCONTINUED] GENERIC EXTERNAL MEDICATION  100 mg Intravenous  Generic External Data Provider        [DISCONTINUED] heparin (porcine) injection  5,000 Units Subcutaneous  Generic External Data Provider        [DISCONTINUED] HYDROcodone-acetaminophen 5-325 mg per tablet  1 tablet Oral Q4H PRN Generic External Data Provider        [DISCONTINUED] levothyroxine tablet  100 mcg Oral  Generic External Data Provider        [DISCONTINUED] oxybutynin 24 hr tablet  5 mg Oral  Generic External Data Provider        [DISCONTINUED] potassium chloride packet  40 mEq Oral  Generic External Data Provider        [DISCONTINUED] predniSONE tablet  20 mg Oral  Generic External Data Provider        [DISCONTINUED] sodium chloride tablet  1 g Oral  Generic External Data Provider            Review of patient's allergies indicates:   Allergen Reactions    Bactrim [sulfamethoxazole-trimethoprim]     Ciprofloxacin     Prednisone        Physical Exam      Vital Signs  Temp: 98.4 °F (36.9 °C)  Pulse: 78  SpO2: 96 %  BP: 128/84  Height and Weight  Weight: 67.8 kg (149 lb 9.3 oz)]    Physical Exam  Constitutional:       Appearance: She is well-developed.   HENT:      Head: Normocephalic and atraumatic.      Right Ear: External ear normal.      Left Ear: External ear normal.   Eyes:      General:         Right eye: No discharge.          Left eye: No discharge.   Cardiovascular:      Rate and Rhythm: Normal rate and regular rhythm.      Heart sounds: Normal heart sounds. No murmur heard.  Pulmonary:      Effort: Pulmonary effort is normal. No respiratory distress.      Breath sounds: Normal breath sounds.   Abdominal:      General: There is no distension.      Palpations: Abdomen is soft.      Tenderness: There is no abdominal tenderness. There is no guarding.   Musculoskeletal:         General: Normal range of motion.      Cervical back: Normal range of motion.   Skin:     General: Skin is warm and dry.   Neurological:      Mental Status: She is alert and oriented to person, place, and time.   Psychiatric:         Behavior: Behavior normal.        Laboratory:  CBC:  No results for input(s): WBC, RBC, HGB, HCT, PLT, MCV, MCH, MCHC in the last 2160 hours.  CMP:  Recent Labs   Lab Result Units 09/27/22  0831   Glucose mg/dL 117*   Calcium mg/dL 10.5   Albumin g/dL 4.2   Total Protein g/dL 7.1   Sodium mmol/L 141   Potassium mmol/L 3.6   CO2 mmol/L 27   Chloride mmol/L 100   BUN mg/dL 21   Alkaline Phosphatase U/L 76   ALT U/L 18   AST U/L 25   Total Bilirubin mg/dL 0.9     URINALYSIS:  No results for input(s): COLORU, CLARITYU, SPECGRAV, PHUR, PROTEINUA, GLUCOSEU, BILIRUBINCON, BLOODU, WBCU, RBCU, BACTERIA, MUCUS, NITRITE, LEUKOCYTESUR, UROBILINOGEN, HYALINECASTS in the last 2160 hours.   LIPIDS:  Recent Labs   Lab Result Units 09/27/22  0831   TSH uIU/mL 5.231*     TSH:  Recent Labs   Lab Result Units 09/27/22  0831   TSH uIU/mL 5.231*     A1C:  Recent Labs   Lab Result Units 09/27/22  0831   Hemoglobin A1C % 6.4*       Radiology:  No results found in the last 30 days.     Assessment/Plan     Pradip Zelaya is a 89 y.o.female with:    1. Essential hypertension    2. Hyponatremia    3. Hypertensive kidney disease with stage 3a chronic kidney disease  - BASIC METABOLIC PANEL; Future    4. Leg swelling  - furosemide (LASIX) 20 MG tablet; Take 1  tablet (20 mg total) by mouth daily as needed (leg swelling).  Dispense: 30 tablet; Refill: 1      -increase ditropan to 10 mg  -monitor BP, no BP meds for now  -stop K if K level normal on labs  -will try lasix PRN  -Continue current medications and maintain follow up with specialists.    -Follow up if symptoms worsen or fail to improve.       Herminia Patiño MD  [facility]      Answers submitted by the patient for this visit:  Review of Systems Questionnaire (Submitted on 11/16/2022)  activity change: Yes  unexpected weight change: No  rhinorrhea: No  trouble swallowing: No  visual disturbance: No  chest tightness: No  polyuria: No  difficulty urinating: No  menstrual problem: No  joint swelling: No  arthralgias: No  confusion: No  dysphoric mood: No

## 2022-12-21 NOTE — ASSESSMENT & PLAN NOTE
BP controlled today on no meds, no SOB/CP/HA.  Has been exercising as much as she can around the house.

## 2023-01-10 ENCOUNTER — DOCUMENT SCAN (OUTPATIENT)
Dept: HOME HEALTH SERVICES | Facility: HOSPITAL | Age: 88
End: 2023-01-10
Payer: MEDICARE

## 2023-02-27 ENCOUNTER — DOCUMENT SCAN (OUTPATIENT)
Dept: HOME HEALTH SERVICES | Facility: HOSPITAL | Age: 88
End: 2023-02-27
Payer: MEDICARE

## 2023-03-16 NOTE — ASSESSMENT & PLAN NOTE
Stable with albuterol inhaler (1-2 times per month) and nebs PRN. Breo caused hoarseness in past.  Does well.  No SOB/wheezing.  Uses inhaler in AM usually, occasionally in the afternoon but not every day.

## 2023-03-16 NOTE — PROGRESS NOTES
[facility] Note    Chief Complaint      No chief complaint on file.    History of Present Illness      Pradip Zelaya is a 89 y.o. female who presents today for follow up of HTN.  Patient comes to appointment with child.    Getting some pain in LUE, comes and goes.  Takes tylenol which helps.  Problem List Items Addressed This Visit       Hypothyroidism    Current Assessment & Plan     Stable on synthroid 112 mcg daily, no S/S of hypo/hyperthyroidism.         Relevant Orders    TSH    T4, FREE    Mild chronic obstructive pulmonary disease    Current Assessment & Plan     Stable with albuterol inhaler (1-2 times per month) and nebs PRN. Breo caused hoarseness in past.  Does well.  No SOB/wheezing.  Uses inhaler in AM usually, occasionally in the afternoon but not every day.         Mixed hyperlipidemia    Current Assessment & Plan     Not on Rx meds, takes niacin.    The ASCVD Risk score (Francine MACKAY, et al., 2019) failed to calculate for the following reasons:    The 2019 ASCVD risk score is only valid for ages 40 to 79           Relevant Orders    CBC Auto Differential    Comprehensive Metabolic Panel    Lipid Panel    Overactive bladder    Current Assessment & Plan     Stable on ditropan 10 mg daily.  Works well for her.  Gets up twice at night to use the restroom.         Essential hypertension    Current Assessment & Plan     BP controlled today on no meds, no SOB/CP/HA.  Has been exercising as much as she can around the house.           Atherosclerosis of aorta    Hypertensive kidney disease with stage 3a chronic kidney disease    Current Assessment & Plan     Labs stable in 1/2023, GFR >60         Prediabetes - Primary    Current Assessment & Plan     A1C 6.6 in 3/2022, worsened from prior.         Relevant Orders    Hemoglobin A1C           Health Maintenance   Topic Date Due    TETANUS VACCINE  Never done    Lipid Panel  03/29/2027       Past Medical History:   Diagnosis Date    Senile  osteoporosis 2020       Past Surgical History:   Procedure Laterality Date    ADENOIDECTOMY      EYE SURGERY      TONSILLECTOMY         family history includes Cancer in her son; Hearing loss in her father and paternal grandfather; Heart disease in her mother; Hypertension in her father; Stroke in her father.    Social History     Tobacco Use    Smoking status: Former     Packs/day: 0.25     Years: 10.00     Pack years: 2.50     Types: Cigarettes     Start date: 1943     Quit date: 1953     Years since quittin.9    Smokeless tobacco: Never    Tobacco comments:     Extremely light usage.   Substance Use Topics    Alcohol use: Not Currently    Drug use: Never       Review of Systems   Constitutional:  Negative for chills and fever.   HENT:  Positive for hearing loss.    Respiratory:  Negative for cough and shortness of breath.    Cardiovascular:  Negative for chest pain and palpitations.   Gastrointestinal:  Negative for constipation, diarrhea, nausea and vomiting.   Genitourinary:  Negative for dysuria and hematuria.   Musculoskeletal:  Negative for falls.   Neurological:  Negative for headaches.      Outpatient Encounter Medications as of 3/17/2023   Medication Sig Dispense Refill    albuterol (PROAIR HFA) 90 mcg/actuation inhaler Inhale 2 puffs into the lungs every 6 (six) hours as needed for Wheezing. Rescue 18 g 5    ascorbic acid, vitamin C, (VITAMIN C) 500 MG tablet Take 500 mg by mouth once daily.      aspirin-calcium carbonate 81 mg-300 mg calcium(777 mg) Tab Take 81 mg by mouth once daily.      coenzyme Q10 100 mg capsule Take 100 mg by mouth once daily.      folic acid/multivit-min/lutein (CENTRUM SILVER ORAL) Take 1 capsule by mouth once daily.      furosemide (LASIX) 20 MG tablet Take 1 tablet (20 mg total) by mouth daily as needed (leg swelling). 30 tablet 1    glucosamine-chondroitin 500-400 mg tablet Take 1 tablet by mouth once daily.      levothyroxine (SYNTHROID) 100 MCG tablet  Take 1 tablet (100 mcg total) by mouth once daily. 90 tablet 3    niacin 500 MG CpSR Take 250 mg by mouth every evening.      oxybutynin (DITROPAN-XL) 5 MG TR24 Take 1 tablet (5 mg total) by mouth once daily. 90 tablet 3    potassium chloride SA (K-DUR,KLOR-CON M) 10 MEQ tablet Take 10 mEq by mouth 2 (two) times daily.      prednisoLONE acetate (PRED FORTE) 1 % DrpS Place 1 drop into both eyes 4 (four) times daily. for 10 days 5 mL 3    [DISCONTINUED] sodium chloride 1 gram tablet Take 1 g by mouth 3 (three) times daily.       No facility-administered encounter medications on file as of 3/17/2023.        Review of patient's allergies indicates:   Allergen Reactions    Bactrim [sulfamethoxazole-trimethoprim]     Ciprofloxacin     Prednisone        Physical Exam      Vital Signs  Pulse: 64  SpO2: 95 %  BP: 122/78  Pain Score:   5  Pain Loc: Arm  Height and Weight  Weight: 70.6 kg (155 lb 10.3 oz)]    Physical Exam  Constitutional:       Appearance: She is well-developed.   HENT:      Head: Normocephalic and atraumatic.   Cardiovascular:      Rate and Rhythm: Normal rate and regular rhythm.      Heart sounds: Normal heart sounds. No murmur heard.  Pulmonary:      Effort: Pulmonary effort is normal. No respiratory distress.      Breath sounds: Normal breath sounds.   Abdominal:      General: There is no distension.      Palpations: Abdomen is soft.      Tenderness: There is no abdominal tenderness. There is no guarding.   Skin:     General: Skin is warm and dry.   Neurological:      Mental Status: She is alert. Mental status is at baseline.   Psychiatric:         Behavior: Behavior normal.        Laboratory:  CBC:  No results for input(s): WBC, RBC, HGB, HCT, PLT, MCV, MCH, MCHC in the last 2160 hours.  CMP:  Recent Labs   Lab Result Units 12/21/22  1215   Glucose mg/dL 113*   Calcium mg/dL 9.7   Sodium mmol/L 136   Potassium mmol/L 4.7   CO2 mmol/L 25   Chloride mmol/L 98   BUN mg/dL 16     URINALYSIS:  No results for  input(s): COLORU, CLARITYU, SPECGRAV, PHUR, PROTEINUA, GLUCOSEU, BILIRUBINCON, BLOODU, WBCU, RBCU, BACTERIA, MUCUS, NITRITE, LEUKOCYTESUR, UROBILINOGEN, HYALINECASTS in the last 2160 hours.   LIPIDS:  No results for input(s): TSH, HDL, CHOL, TRIG, LDLCALC, CHOLHDL, NONHDLCHOL, TOTALCHOLEST in the last 2160 hours.    TSH:  No results for input(s): TSH in the last 2160 hours.    A1C:  No results for input(s): HGBA1C in the last 2160 hours.      Radiology:  No results found in the last 30 days.     Assessment/Plan     Pradip Zelaya is a 89 y.o.female with:    1. Prediabetes  - Hemoglobin A1C; Future    2. Acquired hypothyroidism  - TSH; Future  - T4, FREE; Future    3. Hypertensive kidney disease with stage 3a chronic kidney disease    4. Overactive bladder    5. Essential hypertension    6. Mixed hyperlipidemia  - CBC Auto Differential; Future  - Comprehensive Metabolic Panel; Future  - Lipid Panel; Future    7. Mild chronic obstructive pulmonary disease    8. Atherosclerosis of aorta        -counseled on increasing exercise  -Continue current medications and maintain follow up with specialists.    -Follow up in about 6 months (around 9/17/2023) for follow up of medical problems.       Herminia Patiño MD  [facility]

## 2023-03-17 ENCOUNTER — OFFICE VISIT (OUTPATIENT)
Dept: PRIMARY CARE CLINIC | Facility: CLINIC | Age: 88
End: 2023-03-17
Payer: MEDICARE

## 2023-03-17 ENCOUNTER — LAB VISIT (OUTPATIENT)
Dept: LAB | Facility: HOSPITAL | Age: 88
End: 2023-03-17
Attending: INTERNAL MEDICINE
Payer: MEDICARE

## 2023-03-17 VITALS
WEIGHT: 155.63 LBS | HEART RATE: 64 BPM | BODY MASS INDEX: 28.64 KG/M2 | DIASTOLIC BLOOD PRESSURE: 78 MMHG | OXYGEN SATURATION: 95 % | SYSTOLIC BLOOD PRESSURE: 122 MMHG

## 2023-03-17 DIAGNOSIS — R73.03 PREDIABETES: ICD-10-CM

## 2023-03-17 DIAGNOSIS — N32.81 OVERACTIVE BLADDER: ICD-10-CM

## 2023-03-17 DIAGNOSIS — E03.9 ACQUIRED HYPOTHYROIDISM: ICD-10-CM

## 2023-03-17 DIAGNOSIS — J44.9 MILD CHRONIC OBSTRUCTIVE PULMONARY DISEASE: ICD-10-CM

## 2023-03-17 DIAGNOSIS — N18.31 HYPERTENSIVE KIDNEY DISEASE WITH STAGE 3A CHRONIC KIDNEY DISEASE: ICD-10-CM

## 2023-03-17 DIAGNOSIS — I12.9 HYPERTENSIVE KIDNEY DISEASE WITH STAGE 3A CHRONIC KIDNEY DISEASE: ICD-10-CM

## 2023-03-17 DIAGNOSIS — R73.03 PREDIABETES: Primary | ICD-10-CM

## 2023-03-17 DIAGNOSIS — E78.2 MIXED HYPERLIPIDEMIA: ICD-10-CM

## 2023-03-17 DIAGNOSIS — I70.0 ATHEROSCLEROSIS OF AORTA: ICD-10-CM

## 2023-03-17 DIAGNOSIS — I10 ESSENTIAL HYPERTENSION: ICD-10-CM

## 2023-03-17 LAB
ALBUMIN SERPL BCP-MCNC: 4.2 G/DL (ref 3.5–5.2)
ALP SERPL-CCNC: 72 U/L (ref 55–135)
ALT SERPL W/O P-5'-P-CCNC: 13 U/L (ref 10–44)
ANION GAP SERPL CALC-SCNC: 9 MMOL/L (ref 8–16)
AST SERPL-CCNC: 24 U/L (ref 10–40)
BASOPHILS # BLD AUTO: 0.09 K/UL (ref 0–0.2)
BASOPHILS NFR BLD: 1.2 % (ref 0–1.9)
BILIRUB SERPL-MCNC: 1 MG/DL (ref 0.1–1)
BUN SERPL-MCNC: 23 MG/DL (ref 10–30)
CALCIUM SERPL-MCNC: 9.8 MG/DL (ref 8.7–10.5)
CHLORIDE SERPL-SCNC: 105 MMOL/L (ref 95–110)
CHOLEST SERPL-MCNC: 231 MG/DL (ref 120–199)
CHOLEST/HDLC SERPL: 3.7 {RATIO} (ref 2–5)
CO2 SERPL-SCNC: 28 MMOL/L (ref 23–29)
CREAT SERPL-MCNC: 0.9 MG/DL (ref 0.5–1.4)
DIFFERENTIAL METHOD: ABNORMAL
EOSINOPHIL # BLD AUTO: 0.3 K/UL (ref 0–0.5)
EOSINOPHIL NFR BLD: 4.6 % (ref 0–8)
ERYTHROCYTE [DISTWIDTH] IN BLOOD BY AUTOMATED COUNT: 14.6 % (ref 11.5–14.5)
EST. GFR  (NO RACE VARIABLE): 58.9 ML/MIN/1.73 M^2
ESTIMATED AVG GLUCOSE: 131 MG/DL (ref 68–131)
GLUCOSE SERPL-MCNC: 109 MG/DL (ref 70–110)
HBA1C MFR BLD: 6.2 % (ref 4–5.6)
HCT VFR BLD AUTO: 44.5 % (ref 37–48.5)
HDLC SERPL-MCNC: 62 MG/DL (ref 40–75)
HDLC SERPL: 26.8 % (ref 20–50)
HGB BLD-MCNC: 14.3 G/DL (ref 12–16)
IMM GRANULOCYTES # BLD AUTO: 0.02 K/UL (ref 0–0.04)
IMM GRANULOCYTES NFR BLD AUTO: 0.3 % (ref 0–0.5)
LDLC SERPL CALC-MCNC: 138.6 MG/DL (ref 63–159)
LYMPHOCYTES # BLD AUTO: 3 K/UL (ref 1–4.8)
LYMPHOCYTES NFR BLD: 41.1 % (ref 18–48)
MCH RBC QN AUTO: 29.7 PG (ref 27–31)
MCHC RBC AUTO-ENTMCNC: 32.1 G/DL (ref 32–36)
MCV RBC AUTO: 93 FL (ref 82–98)
MONOCYTES # BLD AUTO: 0.6 K/UL (ref 0.3–1)
MONOCYTES NFR BLD: 7.6 % (ref 4–15)
NEUTROPHILS # BLD AUTO: 3.3 K/UL (ref 1.8–7.7)
NEUTROPHILS NFR BLD: 45.2 % (ref 38–73)
NONHDLC SERPL-MCNC: 169 MG/DL
NRBC BLD-RTO: 0 /100 WBC
PLATELET # BLD AUTO: 202 K/UL (ref 150–450)
PMV BLD AUTO: 9.2 FL (ref 9.2–12.9)
POTASSIUM SERPL-SCNC: 4.3 MMOL/L (ref 3.5–5.1)
PROT SERPL-MCNC: 7.2 G/DL (ref 6–8.4)
RBC # BLD AUTO: 4.81 M/UL (ref 4–5.4)
SODIUM SERPL-SCNC: 142 MMOL/L (ref 136–145)
T4 FREE SERPL-MCNC: 0.94 NG/DL (ref 0.71–1.51)
TRIGL SERPL-MCNC: 152 MG/DL (ref 30–150)
TSH SERPL DL<=0.005 MIU/L-ACNC: 7.22 UIU/ML (ref 0.4–4)
WBC # BLD AUTO: 7.38 K/UL (ref 3.9–12.7)

## 2023-03-17 PROCEDURE — 99213 OFFICE O/P EST LOW 20 MIN: CPT | Mod: PBBFAC | Performed by: INTERNAL MEDICINE

## 2023-03-17 PROCEDURE — 80061 LIPID PANEL: CPT | Performed by: INTERNAL MEDICINE

## 2023-03-17 PROCEDURE — 83036 HEMOGLOBIN GLYCOSYLATED A1C: CPT | Performed by: INTERNAL MEDICINE

## 2023-03-17 PROCEDURE — 84439 ASSAY OF FREE THYROXINE: CPT | Performed by: INTERNAL MEDICINE

## 2023-03-17 PROCEDURE — 99214 PR OFFICE/OUTPT VISIT, EST, LEVL IV, 30-39 MIN: ICD-10-PCS | Mod: S$PBB,,, | Performed by: INTERNAL MEDICINE

## 2023-03-17 PROCEDURE — 80053 COMPREHEN METABOLIC PANEL: CPT | Performed by: INTERNAL MEDICINE

## 2023-03-17 PROCEDURE — 99214 OFFICE O/P EST MOD 30 MIN: CPT | Mod: S$PBB,,, | Performed by: INTERNAL MEDICINE

## 2023-03-17 PROCEDURE — 85025 COMPLETE CBC W/AUTO DIFF WBC: CPT | Performed by: INTERNAL MEDICINE

## 2023-03-17 PROCEDURE — 99999 PR PBB SHADOW E&M-EST. PATIENT-LVL III: CPT | Mod: PBBFAC,,, | Performed by: INTERNAL MEDICINE

## 2023-03-17 PROCEDURE — 99999 PR PBB SHADOW E&M-EST. PATIENT-LVL III: ICD-10-PCS | Mod: PBBFAC,,, | Performed by: INTERNAL MEDICINE

## 2023-03-17 PROCEDURE — 84443 ASSAY THYROID STIM HORMONE: CPT | Performed by: INTERNAL MEDICINE

## 2023-03-17 PROCEDURE — 36415 COLL VENOUS BLD VENIPUNCTURE: CPT | Performed by: INTERNAL MEDICINE

## 2023-03-17 RX ORDER — PREDNISOLONE ACETATE 10 MG/ML
1 SUSPENSION/ DROPS OPHTHALMIC 4 TIMES DAILY
Qty: 5 ML | Refills: 3 | Status: SHIPPED | OUTPATIENT
Start: 2023-03-17 | End: 2023-03-27

## 2023-04-30 DIAGNOSIS — J44.9 MILD CHRONIC OBSTRUCTIVE PULMONARY DISEASE: ICD-10-CM

## 2023-04-30 DIAGNOSIS — E03.9 ACQUIRED HYPOTHYROIDISM: ICD-10-CM

## 2023-04-30 NOTE — TELEPHONE ENCOUNTER
No care due was identified.  [facility] Embedded Care Due Messages. Reference number: [id number].   4/30/2023 5:32:39 PM CDT

## 2023-05-01 RX ORDER — OXYBUTYNIN CHLORIDE 10 MG/1
TABLET, EXTENDED RELEASE ORAL
Qty: 90 TABLET | Refills: 5 | Status: SHIPPED | OUTPATIENT
Start: 2023-05-01 | End: 2024-01-24

## 2023-05-01 RX ORDER — ALBUTEROL SULFATE 90 UG/1
AEROSOL, METERED RESPIRATORY (INHALATION)
Qty: 18 G | Refills: 5 | Status: SHIPPED | OUTPATIENT
Start: 2023-05-01

## 2023-05-01 RX ORDER — LEVOTHYROXINE SODIUM 100 UG/1
TABLET ORAL
Qty: 90 TABLET | Refills: 5 | Status: SHIPPED | OUTPATIENT
Start: 2023-05-01 | End: 2024-03-22 | Stop reason: SDUPTHER

## 2023-05-01 NOTE — TELEPHONE ENCOUNTER
"Refill Routing Note   Medication(s) are not appropriate for processing by [facility] for the following reason(s):      Clarification of medication (Rx) details    [facility] action(s):  Defer       Medication Therapy Plan: There are no recent dispenses on file for these rx- unsure if pt is taking meds as prescribed; Note in chart states "Stable on synthroid 112 mcg daily", but current dose is 100 mcg      Appointments  past 12m or future 3m with PCP    Date Provider   Last Visit   3/17/2023 Herminia Patiño MD   Next Visit   9/22/2023 Herminia Patiño MD   ED visits in past 90 days: 0        Note composed:12:31 PM 05/01/2023            "

## 2023-05-03 DIAGNOSIS — Z71.89 COMPLEX CARE COORDINATION: ICD-10-CM

## 2023-09-22 ENCOUNTER — LAB VISIT (OUTPATIENT)
Dept: LAB | Facility: HOSPITAL | Age: 88
End: 2023-09-22
Attending: INTERNAL MEDICINE
Payer: MEDICARE

## 2023-09-22 ENCOUNTER — OFFICE VISIT (OUTPATIENT)
Dept: PRIMARY CARE CLINIC | Facility: CLINIC | Age: 88
End: 2023-09-22
Payer: MEDICARE

## 2023-09-22 VITALS
BODY MASS INDEX: 31.43 KG/M2 | SYSTOLIC BLOOD PRESSURE: 122 MMHG | HEIGHT: 61 IN | DIASTOLIC BLOOD PRESSURE: 80 MMHG | HEART RATE: 71 BPM | OXYGEN SATURATION: 96 % | WEIGHT: 166.44 LBS

## 2023-09-22 DIAGNOSIS — R73.03 PREDIABETES: ICD-10-CM

## 2023-09-22 DIAGNOSIS — E03.9 ACQUIRED HYPOTHYROIDISM: ICD-10-CM

## 2023-09-22 DIAGNOSIS — E78.2 MIXED HYPERLIPIDEMIA: ICD-10-CM

## 2023-09-22 DIAGNOSIS — N18.31 HYPERTENSIVE KIDNEY DISEASE WITH STAGE 3A CHRONIC KIDNEY DISEASE: ICD-10-CM

## 2023-09-22 DIAGNOSIS — I12.9 HYPERTENSIVE KIDNEY DISEASE WITH STAGE 3A CHRONIC KIDNEY DISEASE: ICD-10-CM

## 2023-09-22 DIAGNOSIS — N32.81 OVERACTIVE BLADDER: ICD-10-CM

## 2023-09-22 DIAGNOSIS — J44.9 MILD CHRONIC OBSTRUCTIVE PULMONARY DISEASE: ICD-10-CM

## 2023-09-22 DIAGNOSIS — I10 ESSENTIAL HYPERTENSION: ICD-10-CM

## 2023-09-22 DIAGNOSIS — I70.0 ATHEROSCLEROSIS OF AORTA: ICD-10-CM

## 2023-09-22 LAB
ALBUMIN SERPL BCP-MCNC: 4.2 G/DL (ref 3.5–5.2)
ALP SERPL-CCNC: 59 U/L (ref 55–135)
ALT SERPL W/O P-5'-P-CCNC: 20 U/L (ref 10–44)
ANION GAP SERPL CALC-SCNC: 9 MMOL/L (ref 8–16)
AST SERPL-CCNC: 26 U/L (ref 10–40)
BILIRUB SERPL-MCNC: 0.7 MG/DL (ref 0.1–1)
BUN SERPL-MCNC: 21 MG/DL (ref 10–30)
CALCIUM SERPL-MCNC: 10.2 MG/DL (ref 8.7–10.5)
CHLORIDE SERPL-SCNC: 103 MMOL/L (ref 95–110)
CO2 SERPL-SCNC: 29 MMOL/L (ref 23–29)
CREAT SERPL-MCNC: 1 MG/DL (ref 0.5–1.4)
EST. GFR  (NO RACE VARIABLE): 51.6 ML/MIN/1.73 M^2
ESTIMATED AVG GLUCOSE: 146 MG/DL (ref 68–131)
GLUCOSE SERPL-MCNC: 134 MG/DL (ref 70–110)
HBA1C MFR BLD: 6.7 % (ref 4–5.6)
POTASSIUM SERPL-SCNC: 4.7 MMOL/L (ref 3.5–5.1)
PROT SERPL-MCNC: 7.3 G/DL (ref 6–8.4)
SODIUM SERPL-SCNC: 141 MMOL/L (ref 136–145)
T4 FREE SERPL-MCNC: 1.11 NG/DL (ref 0.71–1.51)
TSH SERPL DL<=0.005 MIU/L-ACNC: 5.18 UIU/ML (ref 0.4–4)

## 2023-09-22 PROCEDURE — 99213 OFFICE O/P EST LOW 20 MIN: CPT | Mod: PBBFAC | Performed by: INTERNAL MEDICINE

## 2023-09-22 PROCEDURE — 99999 PR PBB SHADOW E&M-EST. PATIENT-LVL III: ICD-10-PCS | Mod: PBBFAC,,, | Performed by: INTERNAL MEDICINE

## 2023-09-22 PROCEDURE — 99214 PR OFFICE/OUTPT VISIT, EST, LEVL IV, 30-39 MIN: ICD-10-PCS | Mod: S$PBB,,, | Performed by: INTERNAL MEDICINE

## 2023-09-22 PROCEDURE — 80053 COMPREHEN METABOLIC PANEL: CPT | Performed by: INTERNAL MEDICINE

## 2023-09-22 PROCEDURE — 84439 ASSAY OF FREE THYROXINE: CPT | Performed by: INTERNAL MEDICINE

## 2023-09-22 PROCEDURE — 99999 PR PBB SHADOW E&M-EST. PATIENT-LVL III: CPT | Mod: PBBFAC,,, | Performed by: INTERNAL MEDICINE

## 2023-09-22 PROCEDURE — 84443 ASSAY THYROID STIM HORMONE: CPT | Performed by: INTERNAL MEDICINE

## 2023-09-22 PROCEDURE — 99214 OFFICE O/P EST MOD 30 MIN: CPT | Mod: S$PBB,,, | Performed by: INTERNAL MEDICINE

## 2023-09-22 PROCEDURE — 83036 HEMOGLOBIN GLYCOSYLATED A1C: CPT | Performed by: INTERNAL MEDICINE

## 2023-09-22 PROCEDURE — 36415 COLL VENOUS BLD VENIPUNCTURE: CPT | Performed by: INTERNAL MEDICINE

## 2023-09-22 NOTE — PROGRESS NOTES
[facility] Note    Chief Complaint      Chief Complaint   Patient presents with    Follow-up     History of Present Illness      Pradip Zelaya is a 89 y.o. female who presents today for follow up of HTN.  Patient comes to appointment with child.    No falls since last visit.    Problem List Items Addressed This Visit       Hypothyroidism    Current Assessment & Plan     Stable on synthroid 100 mcg daily, no S/S of hypo/hyperthyroidism.         Relevant Orders    TSH    T4, FREE    Comprehensive Metabolic Panel    Mild chronic obstructive pulmonary disease    Current Assessment & Plan     Stable with albuterol inhaler (1-2 times per month) and nebs PRN. Breo caused hoarseness in past.  Does well.  No SOB/wheezing.  Uses inhaler in AM usually, occasionally in the afternoon but not every day.         Mixed hyperlipidemia    Current Assessment & Plan     Not on Rx meds, takes niacin.    The ASCVD Risk score (Francine MACKAY, et al., 2019) failed to calculate for the following reasons:    The 2019 ASCVD risk score is only valid for ages 40 to 79           Overactive bladder    Current Assessment & Plan     Stable on ditropan 10 mg daily.  Works well for her.  Gets up twice at night to use the restroom.         Essential hypertension    Current Assessment & Plan     BP controlled today on no meds, no SOB/CP/HA.  Has been exercising as much as she can around the house.           Atherosclerosis of aorta    Current Assessment & Plan     On ASA.         Hypertensive kidney disease with stage 3a chronic kidney disease    Current Assessment & Plan     Labs stable in 1/2023, GFR >60         Prediabetes    Current Assessment & Plan     A1C 6.2 in 3/2023, worsened from prior.         Relevant Orders    Hemoglobin A1C             Health Maintenance   Topic Date Due    TETANUS VACCINE  Never done    Shingles Vaccine (2 of 3) 11/11/2016    Lipid Panel  03/17/2028       Past Medical History:   Diagnosis Date    Senile  osteoporosis 2020       Past Surgical History:   Procedure Laterality Date    ADENOIDECTOMY      EYE SURGERY      TONSILLECTOMY         family history includes Cancer in her son; Hearing loss in her father and paternal grandfather; Heart disease in her mother; Hypertension in her father; Stroke in her father.    Social History     Tobacco Use    Smoking status: Former     Current packs/day: 0.00     Average packs/day: 0.3 packs/day for 10.0 years (2.5 ttl pk-yrs)     Types: Cigarettes     Start date: 1943     Quit date: 1953     Years since quittin.4    Smokeless tobacco: Never    Tobacco comments:     Extremely light usage.   Substance Use Topics    Alcohol use: Not Currently    Drug use: Never       Review of Systems   Constitutional:  Negative for chills and fever.   HENT:  Positive for hearing loss.    Respiratory:  Negative for cough and shortness of breath.    Cardiovascular:  Negative for chest pain and palpitations.   Gastrointestinal:  Negative for constipation, diarrhea, nausea and vomiting.   Genitourinary:  Negative for dysuria and hematuria.   Musculoskeletal:  Negative for falls.   Neurological:  Negative for headaches.        Outpatient Encounter Medications as of 2023   Medication Sig Dispense Refill    albuterol (PROVENTIL/VENTOLIN HFA) 90 mcg/actuation inhaler INHALE TWO PUFFS into THE lungs EVERY 6 HOURS AS NEEDED FOR WHEEZING. rescue 18 g 5    ascorbic acid, vitamin C, (VITAMIN C) 500 MG tablet Take 500 mg by mouth once daily.      aspirin-calcium carbonate 81 mg-300 mg calcium(777 mg) Tab Take 81 mg by mouth once daily.      coenzyme Q10 100 mg capsule Take 100 mg by mouth once daily.      folic acid/multivit-min/lutein (CENTRUM SILVER ORAL) Take 1 capsule by mouth once daily.      furosemide (LASIX) 20 MG tablet Take 1 tablet (20 mg total) by mouth daily as needed (leg swelling). 30 tablet 1    glucosamine-chondroitin 500-400 mg tablet Take 1 tablet by mouth once  "daily.      levothyroxine (SYNTHROID) 100 MCG tablet TAKE ONE TABLET BY MOUTH ONCE DAILY 90 tablet 5    niacin 500 MG CpSR Take 250 mg by mouth every evening.      oxybutynin (DITROPAN-XL) 10 MG 24 hr tablet TAKE ONE TABLET BY MOUTH ONCE DAILY 90 tablet 5    potassium chloride SA (K-DUR,KLOR-CON M) 10 MEQ tablet Take 10 mEq by mouth 2 (two) times daily.      [DISCONTINUED] oxybutynin (DITROPAN-XL) 5 MG TR24 Take 1 tablet (5 mg total) by mouth once daily. 90 tablet 3     No facility-administered encounter medications on file as of 9/22/2023.        Review of patient's allergies indicates:   Allergen Reactions    Bactrim [sulfamethoxazole-trimethoprim]     Ciprofloxacin     Prednisone        Physical Exam      Vital Signs  Pulse: 71  SpO2: 96 %  BP: 122/80  Pain Score: 0-No pain  Height and Weight  Height: 5' 1" (154.9 cm)  Weight: 75.5 kg (166 lb 7.2 oz)  BSA (Calculated - sq m): 1.8 sq meters  BMI (Calculated): 31.5  Weight in (lb) to have BMI = 25: 132]    Physical Exam  Constitutional:       Appearance: She is well-developed.   HENT:      Head: Normocephalic and atraumatic.   Cardiovascular:      Rate and Rhythm: Normal rate and regular rhythm.      Heart sounds: Normal heart sounds. No murmur heard.  Pulmonary:      Effort: Pulmonary effort is normal. No respiratory distress.      Breath sounds: Normal breath sounds.   Abdominal:      General: There is no distension.      Palpations: Abdomen is soft.      Tenderness: There is no abdominal tenderness. There is no guarding.   Skin:     General: Skin is warm and dry.   Neurological:      Mental Status: She is alert. Mental status is at baseline.   Psychiatric:         Behavior: Behavior normal.          Laboratory:  CBC:  No results for input(s): "WBC", "RBC", "HGB", "HCT", "PLT", "MCV", "MCH", "MCHC" in the last 2160 hours.  CMP:  No results for input(s): "GLU", "CALCIUM", "ALBUMIN", "PROT", "NA", "K", "CO2", "CL", "BUN", "ALKPHOS", "ALT", "AST", "BILITOT" in the " "last 2160 hours.    Invalid input(s): "CREATININ"    URINALYSIS:  No results for input(s): "COLORU", "CLARITYU", "SPECGRAV", "PHUR", "PROTEINUA", "GLUCOSEU", "BILIRUBINCON", "BLOODU", "WBCU", "RBCU", "BACTERIA", "MUCUS", "NITRITE", "LEUKOCYTESUR", "UROBILINOGEN", "HYALINECASTS" in the last 2160 hours.   LIPIDS:  No results for input(s): "TSH", "HDL", "CHOL", "TRIG", "LDLCALC", "CHOLHDL", "NONHDLCHOL", "TOTALCHOLEST" in the last 2160 hours.    TSH:  No results for input(s): "TSH" in the last 2160 hours.    A1C:  No results for input(s): "HGBA1C" in the last 2160 hours.      Radiology:  No results found in the last 30 days.     Assessment/Plan     Pradip Zelaya is a 89 y.o.female with:    1. Hypertensive kidney disease with stage 3a chronic kidney disease    2. Overactive bladder    3. Atherosclerosis of aorta    4. Essential hypertension    5. Mixed hyperlipidemia    6. Mild chronic obstructive pulmonary disease    7. Acquired hypothyroidism  - TSH; Future  - T4, FREE; Future  - Comprehensive Metabolic Panel; Future    8. Prediabetes  - Hemoglobin A1C; Future          -counseled on increasing exercise  -Continue current medications and maintain follow up with specialists.    -Follow up in about 6 months (around 3/22/2024) for follow up of medical problems.       Herminia Patiño MD  [facility]    "

## 2023-10-06 ENCOUNTER — OFFICE VISIT (OUTPATIENT)
Dept: PRIMARY CARE CLINIC | Facility: CLINIC | Age: 88
End: 2023-10-06
Payer: MEDICARE

## 2023-10-06 ENCOUNTER — HOSPITAL ENCOUNTER (OUTPATIENT)
Dept: RADIOLOGY | Facility: HOSPITAL | Age: 88
Discharge: HOME | End: 2023-10-06
Attending: NURSE PRACTITIONER
Payer: MEDICARE

## 2023-10-06 VITALS
SYSTOLIC BLOOD PRESSURE: 110 MMHG | OXYGEN SATURATION: 96 % | HEART RATE: 77 BPM | WEIGHT: 164.69 LBS | BODY MASS INDEX: 31.12 KG/M2 | DIASTOLIC BLOOD PRESSURE: 78 MMHG

## 2023-10-06 DIAGNOSIS — R05.9 COUGH, UNSPECIFIED TYPE: ICD-10-CM

## 2023-10-06 DIAGNOSIS — J44.9 MILD CHRONIC OBSTRUCTIVE PULMONARY DISEASE: Primary | ICD-10-CM

## 2023-10-06 DIAGNOSIS — J44.9 MILD CHRONIC OBSTRUCTIVE PULMONARY DISEASE: ICD-10-CM

## 2023-10-06 LAB
CTP QC/QA: YES
CTP QC/QA: YES
POC MOLECULAR INFLUENZA A AGN: NEGATIVE
POC MOLECULAR INFLUENZA B AGN: NEGATIVE
SARS-COV-2 AG RESP QL IA.RAPID: NEGATIVE

## 2023-10-06 PROCEDURE — 99999PBSHW POCT INFLUENZA A/B MOLECULAR: ICD-10-PCS | Mod: PBBFAC,,,

## 2023-10-06 PROCEDURE — 99999PBSHW SARS CORONAVIRUS 2 ANTIGEN POCT, MANUAL READ: Mod: PBBFAC,,,

## 2023-10-06 PROCEDURE — 99999 PR PBB SHADOW E&M-EST. PATIENT-LVL III: ICD-10-PCS | Mod: PBBFAC,,, | Performed by: NURSE PRACTITIONER

## 2023-10-06 PROCEDURE — 99213 PR OFFICE/OUTPT VISIT, EST, LEVL III, 20-29 MIN: ICD-10-PCS | Mod: S$PBB,,, | Performed by: NURSE PRACTITIONER

## 2023-10-06 PROCEDURE — 71046 X-RAY EXAM CHEST 2 VIEWS: CPT | Mod: TC

## 2023-10-06 PROCEDURE — 71046 X-RAY EXAM CHEST 2 VIEWS: CPT | Mod: 26,,, | Performed by: RADIOLOGY

## 2023-10-06 PROCEDURE — 87502 INFLUENZA DNA AMP PROBE: CPT | Mod: PBBFAC | Performed by: NURSE PRACTITIONER

## 2023-10-06 PROCEDURE — 99999PBSHW POCT INFLUENZA A/B MOLECULAR: Mod: PBBFAC,,,

## 2023-10-06 PROCEDURE — 99999 PR PBB SHADOW E&M-EST. PATIENT-LVL III: CPT | Mod: PBBFAC,,, | Performed by: NURSE PRACTITIONER

## 2023-10-06 PROCEDURE — 99213 OFFICE O/P EST LOW 20 MIN: CPT | Mod: S$PBB,,, | Performed by: NURSE PRACTITIONER

## 2023-10-06 PROCEDURE — 99213 OFFICE O/P EST LOW 20 MIN: CPT | Mod: PBBFAC,25 | Performed by: NURSE PRACTITIONER

## 2023-10-06 PROCEDURE — 71046 XR CHEST PA AND LATERAL: ICD-10-PCS | Mod: 26,,, | Performed by: RADIOLOGY

## 2023-10-06 PROCEDURE — 87811 SARS-COV-2 COVID19 W/OPTIC: CPT | Mod: PBBFAC | Performed by: NURSE PRACTITIONER

## 2023-10-06 RX ORDER — AZITHROMYCIN 250 MG/1
TABLET, FILM COATED ORAL
Qty: 6 TABLET | Refills: 0 | Status: SHIPPED | OUTPATIENT
Start: 2023-10-06 | End: 2023-10-06

## 2023-10-06 RX ORDER — AZITHROMYCIN 250 MG/1
TABLET, FILM COATED ORAL
Qty: 6 TABLET | Refills: 0 | Status: SHIPPED | OUTPATIENT
Start: 2023-10-06 | End: 2023-10-11

## 2023-10-06 NOTE — PROGRESS NOTES
[facility] Note    Chief Complaint      Chief Complaint   Patient presents with    Cough    Nasal Congestion    Fever       History of Present Illness      Pradip Zelaya is a 89 y.o. female with chronic conditions of COPD, HTN with CKD3, HLD,  Hypothyroidism, lumbar spondylosis who presents  with daughter in law today for: has had a sinus congestion, nasal congestion, pnd, cough productive clear, white phlegm, coughs more at night. Taking Claritin, cough drops. Fever  101 - afebrile since.   Denies chest pain or sob    Allergic to prednisone     Negative for flu and covid     Past Medical History:  Past Medical History:   Diagnosis Date    Senile osteoporosis 2020       Past Surgical History:   has a past surgical history that includes Eye surgery; Tonsillectomy; and Adenoidectomy (193).    Family History:  family history includes Cancer in her son; Hearing loss in her father and paternal grandfather; Heart disease in her mother; Hypertension in her father; Stroke in her father.     Social History:  Social History     Tobacco Use    Smoking status: Former     Current packs/day: 0.00     Average packs/day: 0.3 packs/day for 10.0 years (2.5 ttl pk-yrs)     Types: Cigarettes     Start date: 1943     Quit date: 1953     Years since quittin.4    Smokeless tobacco: Never    Tobacco comments:     Extremely light usage.   Substance Use Topics    Alcohol use: Not Currently    Drug use: Never       Review of Systems   Constitutional:  Negative for chills and fever.   HENT:  Positive for congestion.    Respiratory:  Positive for cough and sputum production. Negative for shortness of breath.    Cardiovascular:  Negative for chest pain and palpitations.   Gastrointestinal:  Negative for constipation, diarrhea, nausea and vomiting.   Genitourinary:  Negative for dysuria and hematuria.   Musculoskeletal:  Negative for falls.   Neurological:  Negative for headaches.         Medications:  Outpatient Encounter Medications as of 10/6/2023   Medication Sig Dispense Refill    albuterol (PROVENTIL/VENTOLIN HFA) 90 mcg/actuation inhaler INHALE TWO PUFFS into THE lungs EVERY 6 HOURS AS NEEDED FOR WHEEZING. rescue 18 g 5    ascorbic acid, vitamin C, (VITAMIN C) 500 MG tablet Take 500 mg by mouth once daily.      aspirin-calcium carbonate 81 mg-300 mg calcium(777 mg) Tab Take 81 mg by mouth once daily.      azithromycin (Z-ANDRA) 250 MG tablet Take 2 tablets by mouth on day 1; Take 1 tablet by mouth on days 2-5 6 tablet 0    coenzyme Q10 100 mg capsule Take 100 mg by mouth once daily.      folic acid/multivit-min/lutein (CENTRUM SILVER ORAL) Take 1 capsule by mouth once daily.      furosemide (LASIX) 20 MG tablet Take 1 tablet (20 mg total) by mouth daily as needed (leg swelling). 30 tablet 1    glucosamine-chondroitin 500-400 mg tablet Take 1 tablet by mouth once daily.      levothyroxine (SYNTHROID) 100 MCG tablet TAKE ONE TABLET BY MOUTH ONCE DAILY 90 tablet 5    niacin 500 MG CpSR Take 250 mg by mouth every evening.      oxybutynin (DITROPAN-XL) 10 MG 24 hr tablet TAKE ONE TABLET BY MOUTH ONCE DAILY 90 tablet 5    potassium chloride SA (K-DUR,KLOR-CON M) 10 MEQ tablet Take 10 mEq by mouth 2 (two) times daily.      [DISCONTINUED] azithromycin (Z-ANDRA) 250 MG tablet Take 2 tablets by mouth on day 1; Take 1 tablet by mouth on days 2-5 6 tablet 0     No facility-administered encounter medications on file as of 10/6/2023.       Allergies:  Review of patient's allergies indicates:   Allergen Reactions    Bactrim [sulfamethoxazole-trimethoprim]     Ciprofloxacin     Prednisone        Health Maintenance:  Immunization History   Administered Date(s) Administered    COVID-19, MRNA, LN-S, PF (Pfizer) (Purple Cap) 01/10/2021, 01/31/2021, 10/07/2021    COVID-19, mRNA, LNP-S, bivalent booster, PF (PFIZER OMICRON) 09/29/2022    Influenza (FLUAD) - Quadrivalent - Adjuvanted - PF  *Preferred* (65+) 10/29/2021, 09/27/2022    Influenza (FLUAD) - Trivalent - Adjuvanted - PF (65+) 09/14/2017, 09/27/2019    Influenza - High Dose - PF (65 years and older) 10/13/2014, 09/23/2015, 09/16/2016, 09/07/2018, 08/28/2020    Influenza - Quadrivalent - High Dose - PF (65 years and older) 08/28/2020    Influenza - Quadrivalent - PF *Preferred* (6 months and older) 09/11/2013    Influenza - Trivalent (ADULT) 09/18/2009, 09/01/2010, 09/11/2013    Pneumococcal Conjugate - 13 Valent 12/11/2014    Pneumococcal Polysaccharide - 23 Valent 01/07/2020    Zoster 09/16/2016      Health Maintenance   Topic Date Due    TETANUS VACCINE  Never done    Shingles Vaccine (2 of 3) 11/11/2016    Lipid Panel  03/17/2028        Physical Exam      Vital Signs  Pulse: 77  SpO2: 96 %  BP: 110/78  BP Location: Right arm  Pain Score: 0-No pain  Height and Weight  Weight: 74.7 kg (164 lb 10.9 oz)]    Physical Exam  Constitutional:       Appearance: She is well-developed.   HENT:      Head: Normocephalic and atraumatic.   Cardiovascular:      Rate and Rhythm: Normal rate and regular rhythm.      Heart sounds: Normal heart sounds. No murmur heard.  Pulmonary:      Effort: Pulmonary effort is normal. No respiratory distress.      Breath sounds: Wheezing present.   Abdominal:      General: There is no distension.      Palpations: Abdomen is soft.      Tenderness: There is no abdominal tenderness. There is no guarding.   Skin:     General: Skin is warm and dry.   Neurological:      Mental Status: She is alert. Mental status is at baseline.   Psychiatric:         Behavior: Behavior normal.        Laboratory:  CBC:  Recent Labs   Lab 12/16/22  0611 12/17/22  0518 03/17/23  0810   WBC 8.5 8.6 7.38   RBC  --   --  4.81   Hemoglobin 11.7 L 12.1 14.3   Hematocrit 35.2 L 36.0 L 44.5   Platelets  --   --  202   MCV 88.1 88.2 93   MCH 29.4 29.7 29.7   MCHC 33.4 33.6 32.1     CMP:  Recent Labs   Lab 09/27/22  0831 12/15/22  1535  03/17/23  0810 09/22/23  0817   Glucose 117 H   < > 109 134 H   Calcium 10.5   < > 9.8 10.2   Albumin 4.2   < > 4.2 4.2   Total Protein 7.1  --  7.2 7.3   Sodium 141   < > 142 141   Potassium 3.6   < > 4.3 4.7   CO2 27   < > 28 29   Carbon Dioxide  --    < >  --   --    Chloride 100   < > 105 103   BUN 21   < > 23 21   Alkaline Phosphatase 76  --  72 59   ALT 18  --  13 20   AST 25  --  24 26   Total Bilirubin 0.9  --  1.0 0.7    < > = values in this interval not displayed.     URINALYSIS:       LIPIDS:  Recent Labs   Lab 01/08/21  0846 02/17/21  0750 03/29/22  0815 09/27/22  0831 03/17/23  0810 09/22/23  0817   TSH 2.820   < > 5.704 H 5.231 H 7.217 H 5.177 H   HDL 69  --  57  --  62  --    Cholesterol 234 H  --  232 H  --  231 H  --    Triglycerides 112  --  129  --  152 H  --    LDL Cholesterol 142.6  --  149.2  --  138.6  --    HDL/Cholesterol Ratio 29.5  --  24.6  --  26.8  --    Non-HDL Cholesterol 165  --  175  --  169  --    Total Cholesterol/HDL Ratio 3.4  --  4.1  --  3.7  --     < > = values in this interval not displayed.     TSH:  Recent Labs   Lab 09/27/22  0831 03/17/23  0810 09/22/23  0817   TSH 5.231 H 7.217 H 5.177 H     A1C:  Recent Labs   Lab 01/08/21  0846 03/29/22  0815 09/27/22  0831 03/17/23  0810 09/22/23  0817   Hemoglobin A1C 6.2 H 6.6 H 6.4 H 6.2 H 6.7 H       Assessment/Plan     Pradip Zelaya is a 89 y.o.female with:    1. Mild chronic obstructive pulmonary disease  - X-Ray Chest PA And Lateral; Future  - SARS Coronavirus 2 Antigen, POCT Manual Read  - POCT Influenza A/B Molecular  - azithromycin (Z-ANDRA) 250 MG tablet; Take 2 tablets by mouth on day 1; Take 1 tablet by mouth on days 2-5  Dispense: 6 tablet; Refill: 0    2. Cough, unspecified type  - SARS Coronavirus 2 Antigen, POCT Manual Read  - POCT Influenza A/B Molecular  - azithromycin (Z-ANDRA) 250 MG tablet; Take 2 tablets by mouth on day 1; Take 1 tablet by mouth on days 2-5  Dispense: 6 tablet; Refill: 0       Chronic conditions  status updated as per HPI.  Other than changes above, cont current medications and maintain follow up with specialists.  No follow-ups on file.    Future Appointments   Date Time Provider Department Center   3/22/2024  7:30 AM Jordan, Jenna C., MD [facility] [place] Adreinne Cotaya, FNP [facility]

## 2023-11-29 ENCOUNTER — TELEPHONE (OUTPATIENT)
Dept: PRIMARY CARE CLINIC | Facility: CLINIC | Age: 88
End: 2023-11-29
Payer: MEDICARE

## 2023-11-29 NOTE — TELEPHONE ENCOUNTER
----- Message from Mehul Ibarra MA sent at 11/29/2023 12:48 PM CST -----  The patient's daughter in law calling to set up an appointment tomorrow. Says the patient is having trouble with her BP numbers running high. No appointments showing for the provider at all. Please give her a call back at [phone].    She's okay with seeing NP she saw at last visit but no openings for her showing up either.

## 2023-12-01 ENCOUNTER — PATIENT MESSAGE (OUTPATIENT)
Dept: PRIMARY CARE CLINIC | Facility: CLINIC | Age: 88
End: 2023-12-01

## 2023-12-01 ENCOUNTER — OFFICE VISIT (OUTPATIENT)
Dept: PRIMARY CARE CLINIC | Facility: CLINIC | Age: 88
End: 2023-12-01
Payer: MEDICARE

## 2023-12-01 VITALS
SYSTOLIC BLOOD PRESSURE: 142 MMHG | DIASTOLIC BLOOD PRESSURE: 86 MMHG | BODY MASS INDEX: 31.3 KG/M2 | OXYGEN SATURATION: 99 % | HEIGHT: 61 IN | WEIGHT: 165.81 LBS | HEART RATE: 76 BPM

## 2023-12-01 DIAGNOSIS — R73.03 PREDIABETES: Primary | ICD-10-CM

## 2023-12-01 DIAGNOSIS — E78.2 MIXED HYPERLIPIDEMIA: ICD-10-CM

## 2023-12-01 DIAGNOSIS — I12.9 HYPERTENSIVE KIDNEY DISEASE WITH STAGE 3A CHRONIC KIDNEY DISEASE: ICD-10-CM

## 2023-12-01 DIAGNOSIS — N18.31 HYPERTENSIVE KIDNEY DISEASE WITH STAGE 3A CHRONIC KIDNEY DISEASE: ICD-10-CM

## 2023-12-01 DIAGNOSIS — E03.9 ACQUIRED HYPOTHYROIDISM: ICD-10-CM

## 2023-12-01 DIAGNOSIS — I10 ESSENTIAL HYPERTENSION: ICD-10-CM

## 2023-12-01 PROCEDURE — 99999 PR PBB SHADOW E&M-EST. PATIENT-LVL III: ICD-10-PCS | Mod: PBBFAC,,, | Performed by: INTERNAL MEDICINE

## 2023-12-01 PROCEDURE — 99214 PR OFFICE/OUTPT VISIT, EST, LEVL IV, 30-39 MIN: ICD-10-PCS | Mod: S$PBB,,, | Performed by: INTERNAL MEDICINE

## 2023-12-01 PROCEDURE — 99213 OFFICE O/P EST LOW 20 MIN: CPT | Mod: PBBFAC | Performed by: INTERNAL MEDICINE

## 2023-12-01 PROCEDURE — 99214 OFFICE O/P EST MOD 30 MIN: CPT | Mod: S$PBB,,, | Performed by: INTERNAL MEDICINE

## 2023-12-01 PROCEDURE — 99999 PR PBB SHADOW E&M-EST. PATIENT-LVL III: CPT | Mod: PBBFAC,,, | Performed by: INTERNAL MEDICINE

## 2023-12-01 RX ORDER — LISINOPRIL 5 MG/1
5 TABLET ORAL DAILY
Qty: 90 TABLET | Refills: 3 | Status: SHIPPED | OUTPATIENT
Start: 2023-12-01 | End: 2024-11-30

## 2023-12-01 NOTE — ASSESSMENT & PLAN NOTE
Stable on synthroid 100 mcg daily, no S/S of hypo/hyperthyroidism. TSH elevated with normal T4 on last labs.

## 2023-12-01 NOTE — PROGRESS NOTES
[facility] Note    Chief Complaint      Chief Complaint   Patient presents with    Hypertension     History of Present Illness      Pradip Zelaya is a 89 y.o. female who presents today for follow up of HTN.  Patient comes to appointment with child.    Problem List Items Addressed This Visit       Hypothyroidism    Current Assessment & Plan     Stable on synthroid 100 mcg daily, no S/S of hypo/hyperthyroidism. TSH elevated with normal T4 on last labs.         Mixed hyperlipidemia    Current Assessment & Plan     Not on Rx meds, takes niacin.    The ASCVD Risk score (Francine MACKAY, et al., 2019) failed to calculate for the following reasons:    The 2019 ASCVD risk score is only valid for ages 40 to 79           Essential hypertension    Current Assessment & Plan     BP has been high at home, up to 180's overnight at one point. No SOB/CP/HA.           Relevant Medications    lisinopriL (PRINIVIL,ZESTRIL) 5 MG tablet    Hypertensive kidney disease with stage 3a chronic kidney disease    Current Assessment & Plan     Labs stable in 9/2023, GFR >60         Prediabetes - Primary    Current Assessment & Plan     A1C 6.7 in 9/2023, worsened from prior.                    Health Maintenance   Topic Date Due    TETANUS VACCINE  Never done    Shingles Vaccine (2 of 3) 11/11/2016    Lipid Panel  03/17/2028       Past Medical History:   Diagnosis Date    Senile osteoporosis 1/7/2020       Past Surgical History:   Procedure Laterality Date    ADENOIDECTOMY  1934    EYE SURGERY      TONSILLECTOMY         family history includes Cancer in her son; Hearing loss in her father and paternal grandfather; Heart disease in her mother; Hypertension in her father; Stroke in her father.    Social History     Tobacco Use    Smoking status: Former     Current packs/day: 0.00     Average packs/day: 0.3 packs/day for 10.0 years (2.5 ttl pk-yrs)     Types: Cigarettes     Start date: 5/1/1943     Quit date: 5/1/1953     Years since  quittin.6    Smokeless tobacco: Never    Tobacco comments:     Extremely light usage.   Substance Use Topics    Alcohol use: Not Currently    Drug use: Never       Review of Systems   Constitutional:  Negative for chills and fever.   HENT:  Positive for hearing loss.    Respiratory:  Negative for cough and shortness of breath.    Cardiovascular:  Negative for chest pain and palpitations.   Gastrointestinal:  Negative for constipation, diarrhea, nausea and vomiting.   Genitourinary:  Negative for dysuria and hematuria.   Musculoskeletal:  Negative for falls.   Neurological:  Negative for headaches.        Outpatient Encounter Medications as of 2023   Medication Sig Dispense Refill    albuterol (PROVENTIL/VENTOLIN HFA) 90 mcg/actuation inhaler INHALE TWO PUFFS into THE lungs EVERY 6 HOURS AS NEEDED FOR WHEEZING. rescue 18 g 5    ascorbic acid, vitamin C, (VITAMIN C) 500 MG tablet Take 500 mg by mouth once daily.      aspirin-calcium carbonate 81 mg-300 mg calcium(777 mg) Tab Take 81 mg by mouth once daily.      coenzyme Q10 100 mg capsule Take 100 mg by mouth once daily.      folic acid/multivit-min/lutein (CENTRUM SILVER ORAL) Take 1 capsule by mouth once daily.      furosemide (LASIX) 20 MG tablet Take 1 tablet (20 mg total) by mouth daily as needed (leg swelling). 30 tablet 1    glucosamine-chondroitin 500-400 mg tablet Take 1 tablet by mouth once daily.      levothyroxine (SYNTHROID) 100 MCG tablet TAKE ONE TABLET BY MOUTH ONCE DAILY 90 tablet 5    niacin 500 MG CpSR Take 250 mg by mouth every evening.      oxybutynin (DITROPAN-XL) 10 MG 24 hr tablet TAKE ONE TABLET BY MOUTH ONCE DAILY 90 tablet 5    potassium chloride SA (K-DUR,KLOR-CON M) 10 MEQ tablet Take 10 mEq by mouth 2 (two) times daily.      lisinopriL (PRINIVIL,ZESTRIL) 5 MG tablet Take 1 tablet (5 mg total) by mouth once daily. 90 tablet 3     No facility-administered encounter medications on file as of 2023.        Review of patient's  "allergies indicates:   Allergen Reactions    Bactrim [sulfamethoxazole-trimethoprim]     Ciprofloxacin     Prednisone        Physical Exam      Vital Signs  Pulse: 76  SpO2: 99 %  BP: (!) 142/86  Pain Score: 0-No pain  Height and Weight  Height: 5' 1" (154.9 cm)  Weight: 75.2 kg (165 lb 12.6 oz)  BSA (Calculated - sq m): 1.8 sq meters  BMI (Calculated): 31.3  Weight in (lb) to have BMI = 25: 132]    Physical Exam  Constitutional:       Appearance: She is well-developed.   HENT:      Head: Normocephalic and atraumatic.   Cardiovascular:      Rate and Rhythm: Normal rate and regular rhythm.      Heart sounds: Normal heart sounds. No murmur heard.  Pulmonary:      Effort: Pulmonary effort is normal. No respiratory distress.      Breath sounds: Normal breath sounds.   Abdominal:      General: There is no distension.      Palpations: Abdomen is soft.      Tenderness: There is no abdominal tenderness. There is no guarding.   Skin:     General: Skin is warm and dry.   Neurological:      Mental Status: She is alert. Mental status is at baseline.   Psychiatric:         Behavior: Behavior normal.          Laboratory:  CBC:  No results for input(s): "WBC", "RBC", "HGB", "HCT", "PLT", "MCV", "MCH", "MCHC" in the last 2160 hours.  CMP:  Recent Labs   Lab Result Units 09/22/23  0817   Glucose mg/dL 134*   Calcium mg/dL 10.2   Albumin g/dL 4.2   Total Protein g/dL 7.3   Sodium mmol/L 141   Potassium mmol/L 4.7   CO2 mmol/L 29   Chloride mmol/L 103   BUN mg/dL 21   Alkaline Phosphatase U/L 59   ALT U/L 20   AST U/L 26   Total Bilirubin mg/dL 0.7       URINALYSIS:  No results for input(s): "COLORU", "CLARITYU", "SPECGRAV", "PHUR", "PROTEINUA", "GLUCOSEU", "BILIRUBINCON", "BLOODU", "WBCU", "RBCU", "BACTERIA", "MUCUS", "NITRITE", "LEUKOCYTESUR", "UROBILINOGEN", "HYALINECASTS" in the last 2160 hours.   LIPIDS:  Recent Labs   Lab Result Units 09/22/23  0817   TSH uIU/mL 5.177*       TSH:  Recent Labs   Lab Result Units 09/22/23  0817 "   TSH uIU/mL 5.177*       A1C:  Recent Labs   Lab Result Units 09/22/23  0817   Hemoglobin A1C % 6.7*         Radiology:  No results found in the last 30 days.     Assessment/Plan     Pradip Zelaya is a 89 y.o.female with:    1. Prediabetes    2. Acquired hypothyroidism    3. Essential hypertension  - lisinopriL (PRINIVIL,ZESTRIL) 5 MG tablet; Take 1 tablet (5 mg total) by mouth once daily.  Dispense: 90 tablet; Refill: 3    4. Mixed hyperlipidemia    5. Hypertensive kidney disease with stage 3a chronic kidney disease            -counseled on increasing exercise  -Continue current medications and maintain follow up with specialists.    -No follow-ups on file.       Herminia Patiño MD  [facility]

## 2023-12-03 DIAGNOSIS — Z71.89 COMPLEX CARE COORDINATION: ICD-10-CM

## 2023-12-08 VITALS — SYSTOLIC BLOOD PRESSURE: 138 MMHG | DIASTOLIC BLOOD PRESSURE: 62 MMHG

## 2024-01-11 DIAGNOSIS — Z00.00 ENCOUNTER FOR MEDICARE ANNUAL WELLNESS EXAM: ICD-10-CM

## 2024-01-24 ENCOUNTER — PATIENT MESSAGE (OUTPATIENT)
Dept: PRIMARY CARE CLINIC | Facility: CLINIC | Age: 89
End: 2024-01-24
Payer: MEDICARE

## 2024-01-24 RX ORDER — OXYBUTYNIN CHLORIDE 15 MG/1
15 TABLET, EXTENDED RELEASE ORAL DAILY
Qty: 90 TABLET | Refills: 3 | Status: SHIPPED | OUTPATIENT
Start: 2024-01-24 | End: 2025-01-23

## 2024-03-01 ENCOUNTER — OFFICE VISIT (OUTPATIENT)
Dept: PRIMARY CARE CLINIC | Facility: CLINIC | Age: 89
End: 2024-03-01
Payer: MEDICARE

## 2024-03-01 VITALS
BODY MASS INDEX: 31.13 KG/M2 | WEIGHT: 164.88 LBS | HEART RATE: 65 BPM | SYSTOLIC BLOOD PRESSURE: 120 MMHG | OXYGEN SATURATION: 96 % | TEMPERATURE: 97 F | DIASTOLIC BLOOD PRESSURE: 80 MMHG | RESPIRATION RATE: 18 BRPM | HEIGHT: 61 IN

## 2024-03-01 DIAGNOSIS — I12.9 HYPERTENSIVE KIDNEY DISEASE WITH STAGE 3A CHRONIC KIDNEY DISEASE: ICD-10-CM

## 2024-03-01 DIAGNOSIS — L30.9 DERMATITIS OF EXTERNAL EAR: Primary | ICD-10-CM

## 2024-03-01 DIAGNOSIS — G63 POLYNEUROPATHY IN DISEASES CLASSIFIED ELSEWHERE: ICD-10-CM

## 2024-03-01 DIAGNOSIS — N18.31 HYPERTENSIVE KIDNEY DISEASE WITH STAGE 3A CHRONIC KIDNEY DISEASE: ICD-10-CM

## 2024-03-01 DIAGNOSIS — E11.59 TYPE 2 DIABETES MELLITUS WITH OTHER CIRCULATORY COMPLICATIONS: ICD-10-CM

## 2024-03-01 DIAGNOSIS — J44.9 MILD CHRONIC OBSTRUCTIVE PULMONARY DISEASE: ICD-10-CM

## 2024-03-01 DIAGNOSIS — I70.0 ATHEROSCLEROSIS OF AORTA: ICD-10-CM

## 2024-03-01 PROCEDURE — 99214 OFFICE O/P EST MOD 30 MIN: CPT | Mod: PBBFAC | Performed by: NURSE PRACTITIONER

## 2024-03-01 PROCEDURE — 99214 OFFICE O/P EST MOD 30 MIN: CPT | Mod: S$PBB,,, | Performed by: NURSE PRACTITIONER

## 2024-03-01 PROCEDURE — 99999 PR PBB SHADOW E&M-EST. PATIENT-LVL IV: CPT | Mod: PBBFAC,,, | Performed by: NURSE PRACTITIONER

## 2024-03-01 RX ORDER — MUPIROCIN 20 MG/G
OINTMENT TOPICAL 3 TIMES DAILY
Qty: 22 G | Refills: 1 | Status: SHIPPED | OUTPATIENT
Start: 2024-03-01

## 2024-03-01 NOTE — PROGRESS NOTES
[facility] Note    Chief Complaint      Chief Complaint   Patient presents with    Otalgia       History of Present Illness      Pradip Zelaya is a 89 y.o. female with chronic conditions of mild COPD, htn, hld, htn with ckd3b, hypothyroidism,  who presents today for: left ear pain x 2-3 days. External. Has been scratching. Denies fever or chills. Has been using cortisone otic drops without relief. Tylenol does help.     Mild COPD: uses albuterol as needed.   Type 2 DM: manages with diet. And exercise.   HTN with CKD3: bp at goal. On lisinopril.   Atherosclerosis of Aorta:  denies chest pain/sob.   Hypothyroid: on synthroid.   Dm2, polyneuropathy: A1C 6.7% discussed diet changes.       Past Medical History:  Past Medical History:   Diagnosis Date    Senile osteoporosis 2020       Past Surgical History:   has a past surgical history that includes Eye surgery; Tonsillectomy; and Adenoidectomy (193).    Family History:  family history includes Cancer in her son; Hearing loss in her father and paternal grandfather; Heart disease in her mother; Hypertension in her father; Stroke in her father.     Social History:  Social History     Tobacco Use    Smoking status: Former     Current packs/day: 0.00     Average packs/day: 0.3 packs/day for 10.0 years (2.5 ttl pk-yrs)     Types: Cigarettes     Start date: 1943     Quit date: 1953     Years since quittin.8    Smokeless tobacco: Never    Tobacco comments:     Extremely light usage.   Substance Use Topics    Alcohol use: Not Currently    Drug use: Never       Review of Systems   Constitutional:  Negative for chills and fever.   HENT:  Positive for ear pain (left).    Respiratory:  Negative for cough and shortness of breath.    Cardiovascular:  Negative for chest pain and palpitations.   Gastrointestinal:  Negative for constipation, diarrhea, nausea and vomiting.   Genitourinary:  Negative for dysuria and hematuria.   Musculoskeletal:   Negative for falls.   Neurological:  Negative for headaches.        Medications:  Outpatient Encounter Medications as of 3/1/2024   Medication Sig Dispense Refill    albuterol (PROVENTIL/VENTOLIN HFA) 90 mcg/actuation inhaler INHALE TWO PUFFS into THE lungs EVERY 6 HOURS AS NEEDED FOR WHEEZING. rescue 18 g 5    ascorbic acid, vitamin C, (VITAMIN C) 500 MG tablet Take 500 mg by mouth once daily.      aspirin-calcium carbonate 81 mg-300 mg calcium(777 mg) Tab Take 81 mg by mouth once daily.      coenzyme Q10 100 mg capsule Take 100 mg by mouth once daily.      folic acid/multivit-min/lutein (CENTRUM SILVER ORAL) Take 1 capsule by mouth once daily.      furosemide (LASIX) 20 MG tablet Take 1 tablet (20 mg total) by mouth daily as needed (leg swelling). 30 tablet 1    glucosamine-chondroitin 500-400 mg tablet Take 1 tablet by mouth once daily.      levothyroxine (SYNTHROID) 100 MCG tablet TAKE ONE TABLET BY MOUTH ONCE DAILY 90 tablet 5    lisinopriL (PRINIVIL,ZESTRIL) 5 MG tablet Take 1 tablet (5 mg total) by mouth once daily. 90 tablet 3    niacin 500 MG CpSR Take 250 mg by mouth every evening.      oxybutynin (DITROPAN XL) 15 MG TR24 Take 1 tablet (15 mg total) by mouth once daily. 90 tablet 3    potassium chloride SA (K-DUR,KLOR-CON M) 10 MEQ tablet Take 10 mEq by mouth 2 (two) times daily.      mupirocin (BACTROBAN) 2 % ointment Apply topically 3 (three) times daily. 22 g 1     No facility-administered encounter medications on file as of 3/1/2024.       Allergies:  Review of patient's allergies indicates:   Allergen Reactions    Bactrim [sulfamethoxazole-trimethoprim]     Ciprofloxacin     Prednisone        Health Maintenance:  Immunization History   Administered Date(s) Administered    COVID-19, MRNA, LN-S, PF (Pfizer) (Purple Cap) 01/10/2021, 01/31/2021, 10/07/2021    COVID-19, mRNA, LNP-S, PF, ellen-sucrose, 30 mcg/0.3 mL (Pfizer 2023 Ages 12+) 10/23/2023    COVID-19, mRNA, LNP-S, bivalent booster, PF (PFIZER  "OMICRON) 09/29/2022    Influenza (FLUAD) - Quadrivalent - Adjuvanted - PF *Preferred* (65+) 10/29/2021, 09/27/2022    Influenza (FLUAD) - Trivalent - Adjuvanted - PF (65+) 09/14/2017, 09/27/2019    Influenza - High Dose - PF (65 years and older) 10/13/2014, 09/23/2015, 09/16/2016, 09/07/2018, 08/28/2020    Influenza - Quadrivalent - High Dose - PF (65 years and older) 08/28/2020    Influenza - Quadrivalent - PF *Preferred* (6 months and older) 09/11/2013    Influenza - Trivalent (ADULT) 09/18/2009, 09/01/2010, 09/11/2013    Pneumococcal Conjugate - 13 Valent 12/11/2014    Pneumococcal Polysaccharide - 23 Valent 01/07/2020    Zoster 09/16/2016      Health Maintenance   Topic Date Due    TETANUS VACCINE  Never done    Shingles Vaccine (2 of 3) 11/11/2016    Lipid Panel  03/17/2028        Physical Exam      Vital Signs  Temp: 97 °F (36.1 °C)  Temp Source: Oral  Pulse: 65  Resp: 18  SpO2: 96 %  BP: 120/80  BP Location: Right arm  Patient Position: Sitting  Pain Score: 0-No pain  Height and Weight  Height: 5' 1" (154.9 cm)  Weight: 74.8 kg (164 lb 14.5 oz)  BSA (Calculated - sq m): 1.79 sq meters  BMI (Calculated): 31.2  Weight in (lb) to have BMI = 25: 132]    Physical Exam  Constitutional:       Appearance: She is well-developed.   HENT:      Head: Normocephalic and atraumatic.      Right Ear: Tympanic membrane normal.      Left Ear: Tympanic membrane normal.      Nose: Nose normal.      Mouth/Throat:      Mouth: Mucous membranes are moist.   Cardiovascular:      Rate and Rhythm: Normal rate and regular rhythm.      Heart sounds: Normal heart sounds. No murmur heard.  Pulmonary:      Effort: Pulmonary effort is normal. No respiratory distress.      Breath sounds: Normal breath sounds.   Abdominal:      General: There is no distension.      Palpations: Abdomen is soft.      Tenderness: There is no abdominal tenderness. There is no guarding.   Skin:     General: Skin is warm and dry.      Comments: Scratch noted to " external Left ear, and Right ear.   No surrounding redness, no purulent drainage.      Neurological:      Mental Status: She is alert. Mental status is at baseline.   Psychiatric:         Behavior: Behavior normal.          Laboratory:  CBC:  Recent Labs   Lab 12/16/22  0611 12/17/22  0518 03/17/23  0810   WBC 8.5 8.6 7.38   RBC  --   --  4.81   Hemoglobin 11.7 L 12.1 14.3   Hematocrit 35.2 L 36.0 L 44.5   Platelets  --   --  202   MCV 88.1 88.2 93   MCH 29.4 29.7 29.7   MCHC 33.4 33.6 32.1     CMP:  Recent Labs   Lab 09/27/22  0831 12/15/22  1533 03/17/23  0810 09/22/23  0817   Glucose 117 H   < > 109 134 H   Calcium 10.5   < > 9.8 10.2   Albumin 4.2   < > 4.2 4.2   Total Protein 7.1  --  7.2 7.3   Sodium 141   < > 142 141   Potassium 3.6   < > 4.3 4.7   CO2 27   < > 28 29   Carbon Dioxide  --    < >  --   --    Chloride 100   < > 105 103   BUN 21   < > 23 21   Alkaline Phosphatase 76  --  72 59   ALT 18  --  13 20   AST 25  --  24 26   Total Bilirubin 0.9  --  1.0 0.7    < > = values in this interval not displayed.     URINALYSIS:       LIPIDS:  Recent Labs   Lab 03/29/22  0815 09/27/22  0831 03/17/23  0810 09/22/23  0817   TSH 5.704 H 5.231 H 7.217 H 5.177 H   HDL 57  --  62  --    Cholesterol 232 H  --  231 H  --    Triglycerides 129  --  152 H  --    LDL Cholesterol 149.2  --  138.6  --    HDL/Cholesterol Ratio 24.6  --  26.8  --    Non-HDL Cholesterol 175  --  169  --    Total Cholesterol/HDL Ratio 4.1  --  3.7  --      TSH:  Recent Labs   Lab 09/27/22  0831 03/17/23  0810 09/22/23  0817   TSH 5.231 H 7.217 H 5.177 H     A1C:  Recent Labs   Lab 03/29/22  0815 09/27/22  0831 03/17/23  0810 09/22/23  0817   Hemoglobin A1C 6.6 H 6.4 H 6.2 H 6.7 H       Assessment/Plan     Pradip Zelaya is a 89 y.o.female with:    1. Dermatitis of external ear  Will trial mupirocin has steroid cream at home as well.   Return to clinic if no improvement, or f/u with derm  No inner ear infection noted.     2. Type 2 diabetes  mellitus with other circulatory complications  3. Polyneuropathy in diseases classified elsewhere  Stable.Continue current meds.  Continue diet and exercise.     4. Mild chronic obstructive pulmonary disease  Stable. Continue current meds.      5. Atherosclerosis of aorta  6. Hypertensive kidney disease with stage 3a chronic kidney disease  Stable. Continue current meds.      Chronic conditions status updated as per HPI.  Other than changes above, cont current medications and maintain follow up with specialists.  No follow-ups on file.    Future Appointments   Date Time Provider Department Center   3/22/2024  7:30 AM Jordan, Jenna C., MD [facility] [place] Adreinne Cotaya, FNP [facility]

## 2024-03-06 PROBLEM — G63 POLYNEUROPATHY IN DISEASES CLASSIFIED ELSEWHERE: Status: ACTIVE | Noted: 2024-03-06

## 2024-03-06 PROBLEM — E11.59 TYPE 2 DIABETES MELLITUS WITH OTHER CIRCULATORY COMPLICATIONS: Status: ACTIVE | Noted: 2024-03-06

## 2024-03-22 ENCOUNTER — OFFICE VISIT (OUTPATIENT)
Dept: PRIMARY CARE CLINIC | Facility: CLINIC | Age: 89
End: 2024-03-22
Payer: MEDICARE

## 2024-03-22 ENCOUNTER — LAB VISIT (OUTPATIENT)
Dept: LAB | Facility: HOSPITAL | Age: 89
End: 2024-03-22
Attending: INTERNAL MEDICINE
Payer: MEDICARE

## 2024-03-22 VITALS
SYSTOLIC BLOOD PRESSURE: 118 MMHG | HEART RATE: 62 BPM | OXYGEN SATURATION: 96 % | DIASTOLIC BLOOD PRESSURE: 76 MMHG | HEIGHT: 61 IN | BODY MASS INDEX: 31.3 KG/M2 | WEIGHT: 165.81 LBS

## 2024-03-22 DIAGNOSIS — E11.59 TYPE 2 DIABETES MELLITUS WITH OTHER CIRCULATORY COMPLICATIONS: ICD-10-CM

## 2024-03-22 DIAGNOSIS — I10 ESSENTIAL HYPERTENSION: ICD-10-CM

## 2024-03-22 DIAGNOSIS — E03.9 ACQUIRED HYPOTHYROIDISM: ICD-10-CM

## 2024-03-22 DIAGNOSIS — N18.31 HYPERTENSIVE KIDNEY DISEASE WITH STAGE 3A CHRONIC KIDNEY DISEASE: ICD-10-CM

## 2024-03-22 DIAGNOSIS — E78.2 MIXED HYPERLIPIDEMIA: ICD-10-CM

## 2024-03-22 DIAGNOSIS — J44.9 MILD CHRONIC OBSTRUCTIVE PULMONARY DISEASE: ICD-10-CM

## 2024-03-22 DIAGNOSIS — E11.59 TYPE 2 DIABETES MELLITUS WITH OTHER CIRCULATORY COMPLICATIONS: Primary | ICD-10-CM

## 2024-03-22 DIAGNOSIS — I12.9 HYPERTENSIVE KIDNEY DISEASE WITH STAGE 3A CHRONIC KIDNEY DISEASE: ICD-10-CM

## 2024-03-22 DIAGNOSIS — N32.81 OVERACTIVE BLADDER: ICD-10-CM

## 2024-03-22 DIAGNOSIS — I70.0 ATHEROSCLEROSIS OF AORTA: ICD-10-CM

## 2024-03-22 PROBLEM — R73.03 PREDIABETES: Status: RESOLVED | Noted: 2021-07-09 | Resolved: 2024-03-22

## 2024-03-22 LAB
ALBUMIN SERPL BCP-MCNC: 3.9 G/DL (ref 3.5–5.2)
ALP SERPL-CCNC: 52 U/L (ref 55–135)
ALT SERPL W/O P-5'-P-CCNC: 17 U/L (ref 10–44)
ANION GAP SERPL CALC-SCNC: 10 MMOL/L (ref 8–16)
AST SERPL-CCNC: 23 U/L (ref 10–40)
BASOPHILS # BLD AUTO: 0.09 K/UL (ref 0–0.2)
BASOPHILS NFR BLD: 1.3 % (ref 0–1.9)
BILIRUB SERPL-MCNC: 0.8 MG/DL (ref 0.1–1)
BUN SERPL-MCNC: 21 MG/DL (ref 10–30)
CALCIUM SERPL-MCNC: 9.6 MG/DL (ref 8.7–10.5)
CHLORIDE SERPL-SCNC: 104 MMOL/L (ref 95–110)
CHOLEST SERPL-MCNC: 245 MG/DL (ref 120–199)
CHOLEST/HDLC SERPL: 4.7 {RATIO} (ref 2–5)
CO2 SERPL-SCNC: 24 MMOL/L (ref 23–29)
CREAT SERPL-MCNC: 1.1 MG/DL (ref 0.5–1.4)
DIFFERENTIAL METHOD BLD: ABNORMAL
EOSINOPHIL # BLD AUTO: 0.3 K/UL (ref 0–0.5)
EOSINOPHIL NFR BLD: 3.7 % (ref 0–8)
ERYTHROCYTE [DISTWIDTH] IN BLOOD BY AUTOMATED COUNT: 13.3 % (ref 11.5–14.5)
EST. GFR  (NO RACE VARIABLE): 46 ML/MIN/1.73 M^2
ESTIMATED AVG GLUCOSE: 151 MG/DL (ref 68–131)
GLUCOSE SERPL-MCNC: 126 MG/DL (ref 70–110)
HBA1C MFR BLD: 6.9 % (ref 4–5.6)
HCT VFR BLD AUTO: 43.5 % (ref 37–48.5)
HDLC SERPL-MCNC: 52 MG/DL (ref 40–75)
HDLC SERPL: 21.2 % (ref 20–50)
HGB BLD-MCNC: 14.3 G/DL (ref 12–16)
IMM GRANULOCYTES # BLD AUTO: 0.03 K/UL (ref 0–0.04)
IMM GRANULOCYTES NFR BLD AUTO: 0.4 % (ref 0–0.5)
LDLC SERPL CALC-MCNC: 156.2 MG/DL (ref 63–159)
LYMPHOCYTES # BLD AUTO: 2.8 K/UL (ref 1–4.8)
LYMPHOCYTES NFR BLD: 40.5 % (ref 18–48)
MCH RBC QN AUTO: 32.3 PG (ref 27–31)
MCHC RBC AUTO-ENTMCNC: 32.9 G/DL (ref 32–36)
MCV RBC AUTO: 98 FL (ref 82–98)
MONOCYTES # BLD AUTO: 0.6 K/UL (ref 0.3–1)
MONOCYTES NFR BLD: 8.9 % (ref 4–15)
NEUTROPHILS # BLD AUTO: 3.1 K/UL (ref 1.8–7.7)
NEUTROPHILS NFR BLD: 45.2 % (ref 38–73)
NONHDLC SERPL-MCNC: 193 MG/DL
NRBC BLD-RTO: 0 /100 WBC
PLATELET # BLD AUTO: 207 K/UL (ref 150–450)
PMV BLD AUTO: 9.8 FL (ref 9.2–12.9)
POTASSIUM SERPL-SCNC: 4.8 MMOL/L (ref 3.5–5.1)
PROT SERPL-MCNC: 7 G/DL (ref 6–8.4)
RBC # BLD AUTO: 4.43 M/UL (ref 4–5.4)
SODIUM SERPL-SCNC: 138 MMOL/L (ref 136–145)
T4 FREE SERPL-MCNC: 1.21 NG/DL (ref 0.71–1.51)
TRIGL SERPL-MCNC: 184 MG/DL (ref 30–150)
TSH SERPL DL<=0.005 MIU/L-ACNC: 4.76 UIU/ML (ref 0.4–4)
WBC # BLD AUTO: 6.94 K/UL (ref 3.9–12.7)

## 2024-03-22 PROCEDURE — 80061 LIPID PANEL: CPT | Performed by: INTERNAL MEDICINE

## 2024-03-22 PROCEDURE — 99213 OFFICE O/P EST LOW 20 MIN: CPT | Mod: PBBFAC | Performed by: INTERNAL MEDICINE

## 2024-03-22 PROCEDURE — 84443 ASSAY THYROID STIM HORMONE: CPT | Performed by: INTERNAL MEDICINE

## 2024-03-22 PROCEDURE — 99214 OFFICE O/P EST MOD 30 MIN: CPT | Mod: S$PBB,,, | Performed by: INTERNAL MEDICINE

## 2024-03-22 PROCEDURE — 85025 COMPLETE CBC W/AUTO DIFF WBC: CPT | Performed by: INTERNAL MEDICINE

## 2024-03-22 PROCEDURE — 99999 PR PBB SHADOW E&M-EST. PATIENT-LVL III: CPT | Mod: PBBFAC,,, | Performed by: INTERNAL MEDICINE

## 2024-03-22 PROCEDURE — 80053 COMPREHEN METABOLIC PANEL: CPT | Performed by: INTERNAL MEDICINE

## 2024-03-22 PROCEDURE — 36415 COLL VENOUS BLD VENIPUNCTURE: CPT | Performed by: INTERNAL MEDICINE

## 2024-03-22 PROCEDURE — 83036 HEMOGLOBIN GLYCOSYLATED A1C: CPT | Performed by: INTERNAL MEDICINE

## 2024-03-22 PROCEDURE — 84439 ASSAY OF FREE THYROXINE: CPT | Performed by: INTERNAL MEDICINE

## 2024-03-22 RX ORDER — SOLIFENACIN SUCCINATE 10 MG/1
10 TABLET, FILM COATED ORAL DAILY
Qty: 30 TABLET | Refills: 11 | Status: SHIPPED | OUTPATIENT
Start: 2024-03-22 | End: 2025-03-22

## 2024-03-22 RX ORDER — LEVOTHYROXINE SODIUM 100 UG/1
100 TABLET ORAL DAILY
Qty: 90 TABLET | Refills: 5 | Status: SHIPPED | OUTPATIENT
Start: 2024-03-22

## 2024-03-22 NOTE — ASSESSMENT & PLAN NOTE
Stable with albuterol inhaler (1-2 times per month) and nebs PRN. Breo caused hoarseness in past.  Does well.  No SOB/wheezing.

## 2024-03-22 NOTE — PROGRESS NOTES
[facility]    Chief Complaint      Chief Complaint   Patient presents with    Follow-up     History of Present Illness      Pradip Zelaya is a 89 y.o. female who presents today for follow up of HTN.  Patient comes to appointment with child.    Problem List Items Addressed This Visit       Atherosclerosis of aorta    Current Assessment & Plan     On ASA.         Essential hypertension    Current Assessment & Plan     BP on Lisinopril 5 mg. No SOB/CP/HA.           Hypertensive kidney disease with stage 3a chronic kidney disease    Current Assessment & Plan     Labs stable in 9/2023, GFR 51, cr 1.3.         Hypothyroidism    Current Assessment & Plan     Stable on synthroid 100 mcg daily, no S/S of hypo/hyperthyroidism. TSH elevated with normal T4 on last labs.         Relevant Medications    levothyroxine (SYNTHROID) 100 MCG tablet    Other Relevant Orders    TSH    T4, FREE    Mild chronic obstructive pulmonary disease    Current Assessment & Plan     Stable with albuterol inhaler (1-2 times per month) and nebs PRN. Breo caused hoarseness in past.  Does well.  No SOB/wheezing.           Mixed hyperlipidemia    Current Assessment & Plan     Not on Rx meds, takes niacin.    The ASCVD Risk score (Francine DK, et al., 2019) failed to calculate for the following reasons:    The 2019 ASCVD risk score is only valid for ages 40 to 79           Overactive bladder    Current Assessment & Plan     Stable on ditropan 10 mg daily.  Works well for her.  Gets up twice at night to use the restroom.         Type 2 diabetes mellitus with other circulatory complications - Primary    Current Assessment & Plan     A1C 6.7 in 9/2023, not on meds. Diet controlled.         Relevant Orders    CBC Auto Differential    Comprehensive Metabolic Panel    Lipid Panel    Microalbumin/Creatinine Ratio, Urine    Hemoglobin A1C                 Health Maintenance   Topic Date Due    TETANUS VACCINE  Never done    Shingles Vaccine  (2 of 3) 2016    Lipid Panel  2028       Past Medical History:   Diagnosis Date    Senile osteoporosis 2020       Past Surgical History:   Procedure Laterality Date    ADENOIDECTOMY      EYE SURGERY      TONSILLECTOMY         family history includes Cancer in her son; Hearing loss in her father and paternal grandfather; Heart disease in her mother; Hypertension in her father; Stroke in her father.    Social History     Tobacco Use    Smoking status: Former     Current packs/day: 0.00     Average packs/day: 0.3 packs/day for 10.0 years (2.5 ttl pk-yrs)     Types: Cigarettes     Start date: 1943     Quit date: 1953     Years since quittin.9    Smokeless tobacco: Never    Tobacco comments:     Extremely light usage.   Substance Use Topics    Alcohol use: Not Currently    Drug use: Never       Review of Systems   Constitutional:  Negative for chills and fever.   HENT:  Positive for hearing loss.    Respiratory:  Negative for cough and shortness of breath.    Cardiovascular:  Negative for chest pain and palpitations.   Gastrointestinal:  Negative for constipation, diarrhea, nausea and vomiting.   Genitourinary:  Negative for dysuria and hematuria.   Musculoskeletal:  Negative for falls.   Neurological:  Negative for headaches.        Outpatient Encounter Medications as of 3/22/2024   Medication Sig Dispense Refill    albuterol (PROVENTIL/VENTOLIN HFA) 90 mcg/actuation inhaler INHALE TWO PUFFS into THE lungs EVERY 6 HOURS AS NEEDED FOR WHEEZING. rescue 18 g 5    ascorbic acid, vitamin C, (VITAMIN C) 500 MG tablet Take 500 mg by mouth once daily.      aspirin-calcium carbonate 81 mg-300 mg calcium(777 mg) Tab Take 81 mg by mouth once daily.      coenzyme Q10 100 mg capsule Take 100 mg by mouth once daily.      folic acid/multivit-min/lutein (CENTRUM SILVER ORAL) Take 1 capsule by mouth once daily.      furosemide (LASIX) 20 MG tablet Take 1 tablet (20 mg total) by mouth daily as needed (leg  "swelling). 30 tablet 1    glucosamine-chondroitin 500-400 mg tablet Take 1 tablet by mouth once daily.      lisinopriL (PRINIVIL,ZESTRIL) 5 MG tablet Take 1 tablet (5 mg total) by mouth once daily. 90 tablet 3    mupirocin (BACTROBAN) 2 % ointment Apply topically 3 (three) times daily. 22 g 1    niacin 500 MG CpSR Take 250 mg by mouth every evening.      oxybutynin (DITROPAN XL) 15 MG TR24 Take 1 tablet (15 mg total) by mouth once daily. 90 tablet 3    potassium chloride SA (K-DUR,KLOR-CON M) 10 MEQ tablet Take 10 mEq by mouth 2 (two) times daily.      [DISCONTINUED] levothyroxine (SYNTHROID) 100 MCG tablet TAKE ONE TABLET BY MOUTH ONCE DAILY 90 tablet 5    levothyroxine (SYNTHROID) 100 MCG tablet Take 1 tablet (100 mcg total) by mouth once daily. 90 tablet 5    solifenacin (VESICARE) 10 MG tablet Take 1 tablet (10 mg total) by mouth once daily. 30 tablet 11     No facility-administered encounter medications on file as of 3/22/2024.        Review of patient's allergies indicates:   Allergen Reactions    Bactrim [sulfamethoxazole-trimethoprim]     Ciprofloxacin     Prednisone        Physical Exam      Vital Signs  Pulse: 62  SpO2: 96 %  BP: 118/76  Pain Score: 0-No pain  Height and Weight  Height: 5' 1" (154.9 cm)  Weight: 75.2 kg (165 lb 12.6 oz)  BSA (Calculated - sq m): 1.8 sq meters  BMI (Calculated): 31.3  Weight in (lb) to have BMI = 25: 132]    Physical Exam  Constitutional:       Appearance: She is well-developed.   HENT:      Head: Normocephalic and atraumatic.   Cardiovascular:      Rate and Rhythm: Normal rate and regular rhythm.      Heart sounds: Normal heart sounds. No murmur heard.  Pulmonary:      Effort: Pulmonary effort is normal. No respiratory distress.      Breath sounds: Normal breath sounds.   Abdominal:      General: There is no distension.      Palpations: Abdomen is soft.      Tenderness: There is no abdominal tenderness. There is no guarding.   Skin:     General: Skin is warm and dry. " "  Neurological:      Mental Status: She is alert. Mental status is at baseline.   Psychiatric:         Behavior: Behavior normal.          Laboratory:  CBC:  No results for input(s): "WBC", "RBC", "HGB", "HCT", "PLT", "MCV", "MCH", "MCHC" in the last 2160 hours.  CMP:  No results for input(s): "GLU", "CALCIUM", "ALBUMIN", "PROT", "NA", "K", "CO2", "CL", "BUN", "ALKPHOS", "ALT", "AST", "BILITOT" in the last 2160 hours.    Invalid input(s): "CREATININ"      URINALYSIS:  No results for input(s): "COLORU", "CLARITYU", "SPECGRAV", "PHUR", "PROTEINUA", "GLUCOSEU", "BILIRUBINCON", "BLOODU", "WBCU", "RBCU", "BACTERIA", "MUCUS", "NITRITE", "LEUKOCYTESUR", "UROBILINOGEN", "HYALINECASTS" in the last 2160 hours.   LIPIDS:  No results for input(s): "TSH", "HDL", "CHOL", "TRIG", "LDLCALC", "CHOLHDL", "NONHDLCHOL", "TOTALCHOLEST" in the last 2160 hours.      TSH:  No results for input(s): "TSH" in the last 2160 hours.      A1C:  No results for input(s): "HGBA1C" in the last 2160 hours.        Radiology:  No results found in the last 30 days.     Assessment/Plan     Pradip Zelaya is a 89 y.o.female with:    1. Type 2 diabetes mellitus with other circulatory complications  - CBC Auto Differential; Future  - Comprehensive Metabolic Panel; Future  - Lipid Panel; Future  - Microalbumin/Creatinine Ratio, Urine; Future  - Hemoglobin A1C; Future    2. Acquired hypothyroidism  - TSH; Future  - T4, FREE; Future  - levothyroxine (SYNTHROID) 100 MCG tablet; Take 1 tablet (100 mcg total) by mouth once daily.  Dispense: 90 tablet; Refill: 5    3. Hypertensive kidney disease with stage 3a chronic kidney disease    4. Overactive bladder    5. Atherosclerosis of aorta    6. Essential hypertension    7. Mixed hyperlipidemia    8. Mild chronic obstructive pulmonary disease      -try vesicare in place of oxybutynin  -counseled on increasing exercise  -Continue current medications and maintain follow up with specialists.    -Follow up in about 6 " months (around 9/22/2024) for follow up of medical problems.       Herminia Patiño MD  [facility]

## 2024-03-24 DIAGNOSIS — M79.89 LEG SWELLING: ICD-10-CM

## 2024-03-24 NOTE — TELEPHONE ENCOUNTER
No care due was identified.  [facility] Embedded Care Due Messages. Reference number: [id number].   3/24/2024 12:26:55 PM CDT

## 2024-03-26 RX ORDER — FUROSEMIDE 20 MG/1
TABLET ORAL
Qty: 30 TABLET | Refills: 1 | Status: SHIPPED | OUTPATIENT
Start: 2024-03-26

## 2024-03-26 NOTE — TELEPHONE ENCOUNTER
Refill Routing Note   Medication(s) are not appropriate for processing by [facility] for the following reason(s):      Medication outside of protocol    [facility] action(s):  Route Care Due:  None identified     Medication Therapy Plan: PRN is outside of protocol for [facility]      Appointments  past 12m or future 3m with PCP    Date Provider   Last Visit   3/22/2024 Herminia Patiño MD   Next Visit   3/25/2024 Herminia Patiño MD   ED visits in past 90 days: 0        Note composed:8:02 PM 03/25/2024

## 2024-07-03 DIAGNOSIS — Z71.89 COMPLEX CARE COORDINATION: ICD-10-CM

## 2024-08-12 DIAGNOSIS — M79.89 LEG SWELLING: ICD-10-CM

## 2024-08-12 NOTE — TELEPHONE ENCOUNTER
Refill Routing Note   Medication(s) are not appropriate for processing by [facility] for the following reason(s):        Outside of protocol    [facility] action(s):  Route      Medication Therapy Plan: PRN USAGE(OP)      Appointments  past 12m or future 3m with PCP    Date Provider   Last Visit   3/22/2024 Herminia Patiño MD   Next Visit   9/30/2024 Herminia Patiño MD   ED visits in past 90 days: 0        Note composed:6:31 PM 08/12/2024

## 2024-08-12 NOTE — TELEPHONE ENCOUNTER
No care due was identified.  [facility] Embedded Care Due Messages. Reference number: [id number].   8/12/2024 1:14:31 PM CDT

## 2024-08-13 RX ORDER — FUROSEMIDE 20 MG/1
TABLET ORAL
Qty: 30 TABLET | Refills: 1 | Status: SHIPPED | OUTPATIENT
Start: 2024-08-13

## 2024-10-21 ENCOUNTER — PATIENT MESSAGE (OUTPATIENT)
Dept: PRIMARY CARE CLINIC | Facility: CLINIC | Age: 89
End: 2024-10-21
Payer: MEDICARE

## 2024-10-22 PROCEDURE — G0180 MD CERTIFICATION HHA PATIENT: HCPCS | Mod: ,,, | Performed by: INTERNAL MEDICINE

## 2024-10-24 ENCOUNTER — OFFICE VISIT (OUTPATIENT)
Dept: PRIMARY CARE CLINIC | Facility: CLINIC | Age: 89
End: 2024-10-24
Payer: MEDICARE

## 2024-10-24 VITALS
DIASTOLIC BLOOD PRESSURE: 64 MMHG | HEART RATE: 79 BPM | OXYGEN SATURATION: 96 % | HEIGHT: 61 IN | WEIGHT: 149.69 LBS | BODY MASS INDEX: 28.26 KG/M2 | SYSTOLIC BLOOD PRESSURE: 112 MMHG

## 2024-10-24 DIAGNOSIS — J12.82 PNEUMONIA DUE TO COVID-19 VIRUS: Primary | ICD-10-CM

## 2024-10-24 DIAGNOSIS — U07.1 PNEUMONIA DUE TO COVID-19 VIRUS: Primary | ICD-10-CM

## 2024-10-24 DIAGNOSIS — J44.9 MILD CHRONIC OBSTRUCTIVE PULMONARY DISEASE: ICD-10-CM

## 2024-10-24 PROCEDURE — 99999 PR PBB SHADOW E&M-EST. PATIENT-LVL III: CPT | Mod: PBBFAC,,, | Performed by: INTERNAL MEDICINE

## 2024-10-24 PROCEDURE — 99213 OFFICE O/P EST LOW 20 MIN: CPT | Mod: PBBFAC | Performed by: INTERNAL MEDICINE

## 2024-10-24 NOTE — PROGRESS NOTES
[facility] Note    Chief Complaint      Chief Complaint   Patient presents with    Hospital Follow Up     History of Present Illness      Pradip Zelaya is a 89 y.o. female who presents today for hospital f/u of COVID 19.  Patient comes to appointment with child.    Admitted 10/19/2024 at  with COVID 19 pneumonia, BLL infiltrates on CXR.  Received 3 doses of remdesivir, much improved and was D/C'ed.    MASOOD on admit, cr back to baseline at [place].    Fatigued since [place].  Gets winded faster doing ADL's since [place]. Ate some food this AM, has mostly been eating Ensure.    Problem List Items Addressed This Visit       Mild chronic obstructive pulmonary disease    Current Assessment & Plan     Stable with albuterol inhaler (1-2 times per month) and nebs PRN. Breo caused hoarseness in past.  Does well.  No SOB/wheezing.            Other Visit Diagnoses       Pneumonia due to COVID-19 virus    -  Primary                        Health Maintenance   Topic Date Due    TETANUS VACCINE  Never done    Shingles Vaccine (2 of 3) 2016    Eye Exam  05/15/2019    Hemoglobin A1c  2024    Lipid Panel  2025       Past Medical History:   Diagnosis Date    Senile osteoporosis 2020       Past Surgical History:   Procedure Laterality Date    ADENOIDECTOMY      EYE SURGERY      TONSILLECTOMY         family history includes Cancer in her son; Hearing loss in her father and paternal grandfather; Heart disease in her mother; Hypertension in her father; Stroke in her father.    Social History     Tobacco Use    Smoking status: Former     Current packs/day: 0.00     Average packs/day: 0.3 packs/day for 10.0 years (2.5 ttl pk-yrs)     Types: Cigarettes     Start date: 1943     Quit date: 1953     Years since quittin.5    Smokeless tobacco: Never    Tobacco comments:     Extremely light usage.   Substance Use Topics    Alcohol use: Not Currently    Drug use: Never       Review of Systems    Constitutional:  Negative for chills and fever.   HENT:  Positive for hearing loss.    Respiratory:  Negative for cough and shortness of breath.    Cardiovascular:  Negative for chest pain and palpitations.   Gastrointestinal:  Negative for constipation, diarrhea, nausea and vomiting.   Genitourinary:  Negative for dysuria and hematuria.   Musculoskeletal:  Negative for falls.   Neurological:  Negative for headaches.        Outpatient Encounter Medications as of 10/24/2024   Medication Sig Dispense Refill    albuterol (PROVENTIL/VENTOLIN HFA) 90 mcg/actuation inhaler INHALE TWO PUFFS into THE lungs EVERY 6 HOURS AS NEEDED FOR WHEEZING. rescue 18 g 5    ascorbic acid, vitamin C, (VITAMIN C) 500 MG tablet Take 500 mg by mouth once daily.      aspirin-calcium carbonate 81 mg-300 mg calcium(777 mg) Tab Take 81 mg by mouth once daily.      coenzyme Q10 100 mg capsule Take 100 mg by mouth once daily.      folic acid/multivit-min/lutein (CENTRUM SILVER ORAL) Take 1 capsule by mouth once daily.      furosemide (LASIX) 20 MG tablet TAKE ONE TABLET BY MOUTH ONCE DAILY AS NEEDED FOR leg swelling 30 tablet 1    glucosamine-chondroitin 500-400 mg tablet Take 1 tablet by mouth once daily.      levothyroxine (SYNTHROID) 100 MCG tablet Take 1 tablet (100 mcg total) by mouth once daily. 90 tablet 5    lisinopriL (PRINIVIL,ZESTRIL) 5 MG tablet Take 1 tablet (5 mg total) by mouth once daily. 90 tablet 3    niacin 500 MG CpSR Take 250 mg by mouth every evening.      potassium chloride SA (K-DUR,KLOR-CON M) 10 MEQ tablet Take 10 mEq by mouth 2 (two) times daily.      solifenacin (VESICARE) 10 MG tablet Take 1 tablet (10 mg total) by mouth once daily. 30 tablet 11    oxybutynin (DITROPAN XL) 15 MG TR24 Take 1 tablet (15 mg total) by mouth once daily. 90 tablet 3    [DISCONTINUED] mupirocin (BACTROBAN) 2 % ointment Apply topically 3 (three) times daily. 22 g 1     No facility-administered encounter medications on file as of 10/24/2024.  "       Review of patient's allergies indicates:   Allergen Reactions    Bactrim [sulfamethoxazole-trimethoprim]     Ciprofloxacin     Prednisone        Physical Exam      Vital Signs  Pulse: 79  SpO2: 96 %  BP: 112/64  Pain Score: 0-No pain  Height and Weight  Height: 5' 1" (154.9 cm)  Weight: 67.9 kg (149 lb 11.1 oz)  BSA (Calculated - sq m): 1.71 sq meters  BMI (Calculated): 28.3  Weight in (lb) to have BMI = 25: 132]    Physical Exam  Constitutional:       Appearance: She is well-developed.   HENT:      Head: Normocephalic and atraumatic.   Cardiovascular:      Rate and Rhythm: Normal rate and regular rhythm.      Heart sounds: Normal heart sounds. No murmur heard.  Pulmonary:      Effort: Pulmonary effort is normal. No respiratory distress.      Breath sounds: Normal breath sounds.   Abdominal:      General: There is no distension.      Palpations: Abdomen is soft.      Tenderness: There is no abdominal tenderness. There is no guarding.   Skin:     General: Skin is warm and dry.   Neurological:      Mental Status: She is alert. Mental status is at baseline.   Psychiatric:         Behavior: Behavior normal.          Laboratory:  CBC:  Recent Labs   Lab Result Units 10/20/24  0521 10/21/24  0801   WBC 103/uL 6.2 5.3   Hemoglobin gm/dL 13.1 13.2   Hematocrit % 38.7 39.2   MCV fL 94.0 93.2   MCH pg 31.7 31.4   MCHC g/dL 33.7 33.7     CMP:  Recent Labs   Lab Result Units 10/19/24  1040   Potassium mmol/L 4.2   AST U/L 28   Total Bilirubin mg/dL 0.5         URINALYSIS:  No results for input(s): "COLORU", "CLARITYU", "SPECGRAV", "PHUR", "PROTEINUA", "GLUCOSEU", "BILIRUBINCON", "BLOODU", "WBCU", "RBCU", "BACTERIA", "MUCUS", "NITRITE", "LEUKOCYTESUR", "UROBILINOGEN", "HYALINECASTS" in the last 2160 hours.   LIPIDS:  Recent Labs   Lab Result Units 10/19/24  0918   TSH uIU/mL 5.16*         TSH:  Recent Labs   Lab Result Units 10/19/24  0918   TSH uIU/mL 5.16*         A1C:  No results for input(s): "HGBA1C" in the last " 2160 hours.        Radiology:  No results found in the last 30 days.     Assessment/Plan     Pradip Zelaya is a 89 y.o.female with:    1. Pneumonia due to COVID-19 virus    2. Mild chronic obstructive pulmonary disease        -encouraged PO intake  -Continue current medications and maintain follow up with specialists.    -No follow-ups on file.       Herminia Patiño MD  [facility]

## 2024-12-06 ENCOUNTER — OFFICE VISIT (OUTPATIENT)
Dept: PRIMARY CARE CLINIC | Facility: CLINIC | Age: 89
End: 2024-12-06
Payer: MEDICARE

## 2024-12-06 VITALS
SYSTOLIC BLOOD PRESSURE: 130 MMHG | BODY MASS INDEX: 27.78 KG/M2 | HEART RATE: 87 BPM | DIASTOLIC BLOOD PRESSURE: 70 MMHG | WEIGHT: 147.06 LBS | OXYGEN SATURATION: 95 %

## 2024-12-06 DIAGNOSIS — S00.83XD CONTUSION OF FACE, SUBSEQUENT ENCOUNTER: Primary | ICD-10-CM

## 2024-12-06 DIAGNOSIS — S01.81XD FACIAL LACERATION, SUBSEQUENT ENCOUNTER: ICD-10-CM

## 2024-12-06 DIAGNOSIS — Z48.02 VISIT FOR SUTURE REMOVAL: ICD-10-CM

## 2024-12-06 PROBLEM — D32.9 MENINGIOMA: Status: ACTIVE | Noted: 2024-12-06

## 2024-12-06 PROCEDURE — 99999 PR PBB SHADOW E&M-EST. PATIENT-LVL III: CPT | Mod: PBBFAC,,, | Performed by: NURSE PRACTITIONER

## 2024-12-06 PROCEDURE — 99213 OFFICE O/P EST LOW 20 MIN: CPT | Mod: PBBFAC | Performed by: NURSE PRACTITIONER

## 2024-12-06 NOTE — PROGRESS NOTES
[facility] Note    Chief Complaint      Chief Complaint   Patient presents with    Follow-up    Suture / Staple Removal       History of Present Illness      Pradip Zelaya is a 89 y.o. female with chronic conditions of osteoporosis who presents today for: hospital follow up/suture removal.   Reviewed ER record below.     HPI:  Pradip Zelaya is a 89 y.o. female presenting to the ED with fall. Patient is present with family members History comes from patient. Patient reports she tripped on a rug falling forward striking her face just PTA. Denies LOC. Sustained laceration to bridge of nose and left periorbital area. Reports mild pain. No blurred vision. No focal weakness. Denies hip pain and was able to ambulate after the event. No other specific complaint. Tetanus immunization was unknown.  Had CT of spine, showed cervical spondylosis.   Ct of maxillofacial, no evidence of fracture.   CT of head, no evidence for skull fracture    Past Medical History:  Past Medical History:   Diagnosis Date    Senile osteoporosis 2020       Past Surgical History:   has a past surgical history that includes Eye surgery; Tonsillectomy; and Adenoidectomy (89).    Family History:  family history includes Cancer in her son; Hearing loss in her father and paternal grandfather; Heart disease in her mother; Hypertension in her father; Stroke in her father.     Social History:  Social History     Tobacco Use    Smoking status: Former     Current packs/day: 0.00     Average packs/day: 0.3 packs/day for 10.0 years (2.5 ttl pk-yrs)     Types: Cigarettes     Start date: 1943     Quit date: 1953     Years since quittin.6    Smokeless tobacco: Never    Tobacco comments:     Extremely light usage.   Substance Use Topics    Alcohol use: Not Currently    Drug use: Never       Review of Systems   Constitutional:  Negative for chills and fever.   Respiratory:  Negative for cough and shortness of breath.     Cardiovascular:  Negative for chest pain and palpitations.   Gastrointestinal:  Negative for constipation, diarrhea, nausea and vomiting.   Genitourinary:  Negative for dysuria and hematuria.   Musculoskeletal:  Negative for falls.   Neurological:  Negative for headaches.        Medications:  Outpatient Encounter Medications as of 12/6/2024   Medication Sig Dispense Refill    albuterol (PROVENTIL/VENTOLIN HFA) 90 mcg/actuation inhaler INHALE TWO PUFFS into THE lungs EVERY 6 HOURS AS NEEDED FOR WHEEZING. rescue 18 g 5    ascorbic acid, vitamin C, (VITAMIN C) 500 MG tablet Take 500 mg by mouth once daily.      aspirin-calcium carbonate 81 mg-300 mg calcium(777 mg) Tab Take 81 mg by mouth once daily.      coenzyme Q10 100 mg capsule Take 100 mg by mouth once daily.      folic acid/multivit-min/lutein (CENTRUM SILVER ORAL) Take 1 capsule by mouth once daily.      furosemide (LASIX) 20 MG tablet TAKE ONE TABLET BY MOUTH ONCE DAILY AS NEEDED FOR leg swelling 30 tablet 1    glucosamine-chondroitin 500-400 mg tablet Take 1 tablet by mouth once daily.      levothyroxine (SYNTHROID) 100 MCG tablet Take 1 tablet (100 mcg total) by mouth once daily. 90 tablet 5    lisinopriL (PRINIVIL,ZESTRIL) 5 MG tablet Take 1 tablet (5 mg total) by mouth once daily. 90 tablet 3    niacin 500 MG CpSR Take 250 mg by mouth every evening.      potassium chloride SA (K-DUR,KLOR-CON M) 10 MEQ tablet Take 10 mEq by mouth 2 (two) times daily.      solifenacin (VESICARE) 10 MG tablet Take 1 tablet (10 mg total) by mouth once daily. 30 tablet 11     No facility-administered encounter medications on file as of 12/6/2024.       Allergies:  Review of patient's allergies indicates:   Allergen Reactions    Bactrim [sulfamethoxazole-trimethoprim]     Ciprofloxacin     Prednisone     Sulfa (sulfonamide antibiotics) Hives    Mupirocin Rash       Health Maintenance:  Immunization History   Administered Date(s) Administered    COVID-19, MRNA, LN-S, PF  (Pfizer) (Purple Cap) 01/10/2021, 01/31/2021, 10/07/2021    COVID-19, mRNA, LNP-S, PF, ellen-sucrose, 30 mcg/0.3 mL (Pfizer Ages 12+) 10/23/2023    COVID-19, mRNA, LNP-S, bivalent booster, PF (PFIZER OMICRON) 09/29/2022    Influenza (FLUAD) - Quadrivalent - Adjuvanted - PF *Preferred* (65+) 10/29/2021, 09/27/2022, 10/21/2023    Influenza - Quadrivalent - High Dose - PF (65 years and older) 08/28/2020    Influenza - Quadrivalent - PF *Preferred* (6 months and older) 09/11/2013    Influenza - Trivalent - Afluria, Fluzone MDV 09/18/2009, 09/01/2010, 09/11/2013    Influenza - Trivalent - Fluad - Adjuvanted - PF (65 years and older 09/14/2017, 09/27/2019    Influenza - Trivalent - Fluzone High Dose - PF (65 years and older) 10/13/2014, 09/23/2015, 09/16/2016, 09/07/2018, 08/28/2020    Pneumococcal Conjugate - 13 Valent 12/11/2014    Pneumococcal Polysaccharide - 23 Valent 01/07/2020    Rsv, Bivalent, Rsvpref (Abrysvo) 10/24/2023    Zoster 09/16/2016      Health Maintenance   Topic Date Due    TETANUS VACCINE  Never done    Shingles Vaccine (2 of 3) 11/11/2016    Eye Exam  05/15/2019    COVID-19 Vaccine (6 - 2024-25 season) 09/01/2024    Hemoglobin A1c  09/22/2024    Diabetes Urine Screening  03/22/2025    Lipid Panel  03/22/2025    Influenza Vaccine  Completed    RSV Vaccine (Age 60+ and Pregnant patients)  Completed    Pneumococcal Vaccines (Age 65+)  Completed        Physical Exam      Vital Signs  Pulse: 87  SpO2: 95 %  BP: 130/70  BP Location: Left arm  Height and Weight  Weight: 66.7 kg (147 lb 0.8 oz)]    Physical Exam  Constitutional:       Appearance: She is well-developed.   HENT:      Head: Normocephalic and atraumatic.   Cardiovascular:      Rate and Rhythm: Normal rate and regular rhythm.      Heart sounds: Normal heart sounds. No murmur heard.  Pulmonary:      Effort: Pulmonary effort is normal. No respiratory distress.      Breath sounds: Normal breath sounds.   Abdominal:      Palpations: Abdomen is soft.    Skin:     General: Skin is warm and dry.      Findings: Bruising (noted to face) present.      Comments: 3 sutures removed using aseptic technique to left face. 2 steristrips applied to assist in wound healing. No drainage or erythema noted.  1 suture removed using aseptic technique from bridge of nose, edges well apprx. No drainage or erythema noted. Pt tolerated well.    Neurological:      Mental Status: She is alert. Mental status is at baseline.   Psychiatric:         Behavior: Behavior normal.          Laboratory:  CBC:  Recent Labs   Lab 03/22/24  0819 10/20/24  0521 10/21/24  0801   WBC 6.94 6.2 5.3   RBC 4.43  --   --    Hemoglobin 14.3 13.1 13.2   Hematocrit 43.5 38.7 39.2   Platelets 207  --   --    MCV 98 94.0 93.2   MCH 32.3 H 31.7 31.4   MCHC 32.9 33.7 33.7     CMP:  Recent Labs   Lab 09/22/23  0817 03/22/24  0819 10/19/24  1040   Glucose 134 H 126 H  --    Calcium 10.2 9.6  --    Albumin 4.2 3.9  --    Total Protein 7.3 7.0  --    Sodium 141 138  --    Potassium 4.7 4.8 4.2   CO2 29 24  --    Chloride 103 104  --    BUN 21 21  --    Alkaline Phosphatase 59 52 L  --    ALT 20 17  --    AST 26 23 28   Total Bilirubin 0.7 0.8 0.5     URINALYSIS:       LIPIDS:  Recent Labs   Lab 03/29/22  0815 09/27/22  0831 03/17/23  0810 09/22/23  0817 03/22/24  0819 10/19/24  0918   TSH 5.704 H   < > 7.217 H 5.177 H 4.764 H 5.16 H   HDL 57  --  62  --  52  --    Cholesterol 232 H  --  231 H  --  245 H  --    Triglycerides 129  --  152 H  --  184 H  --    LDL Cholesterol 149.2  --  138.6  --  156.2  --    HDL/Cholesterol Ratio 24.6  --  26.8  --  21.2  --    Non-HDL Cholesterol 175  --  169  --  193  --    Total Cholesterol/HDL Ratio 4.1  --  3.7  --  4.7  --     < > = values in this interval not displayed.     TSH:  Recent Labs   Lab 09/22/23  0817 03/22/24  0819 10/19/24  0918   TSH 5.177 H 4.764 H 5.16 H     A1C:  Recent Labs   Lab 03/29/22  0815 09/27/22  0831 03/17/23  0810 09/22/23  0817 03/22/24  0819    Hemoglobin A1C 6.6 H 6.4 H 6.2 H 6.7 H 6.9 H       Assessment/Plan     Pradip Zelaya is a 89 y.o.female with:    1. Contusion of face, subsequent encounter    2. Facial laceration, subsequent encounter    3. Visit for suture removal  Pt tolerated well.   Instructions on cleaning with soap and water  Discussed signs of infection     4. Meningioma   Noted on CT scan. Asymptomatic. Will continue to monitor.     Chronic conditions status updated as per HPI.  Other than changes above, cont current medications and maintain follow up with specialists.  No follow-ups on file.    Future Appointments   Date Time Provider Department Center   12/17/2024 10:15 AM Jordan, Jenna C., MD [facility] [place] Adrienne Cotaya, FNP [facility]

## 2024-12-17 ENCOUNTER — OFFICE VISIT (OUTPATIENT)
Dept: PRIMARY CARE CLINIC | Facility: CLINIC | Age: 89
End: 2024-12-17
Payer: MEDICARE

## 2024-12-17 VITALS
DIASTOLIC BLOOD PRESSURE: 70 MMHG | OXYGEN SATURATION: 95 % | BODY MASS INDEX: 27.64 KG/M2 | HEIGHT: 61 IN | SYSTOLIC BLOOD PRESSURE: 122 MMHG | WEIGHT: 146.38 LBS | HEART RATE: 86 BPM

## 2024-12-17 DIAGNOSIS — J44.9 MILD CHRONIC OBSTRUCTIVE PULMONARY DISEASE: Primary | ICD-10-CM

## 2024-12-17 DIAGNOSIS — E03.9 ACQUIRED HYPOTHYROIDISM: ICD-10-CM

## 2024-12-17 DIAGNOSIS — N18.31 HYPERTENSIVE KIDNEY DISEASE WITH STAGE 3A CHRONIC KIDNEY DISEASE: ICD-10-CM

## 2024-12-17 DIAGNOSIS — I12.9 HYPERTENSIVE KIDNEY DISEASE WITH STAGE 3A CHRONIC KIDNEY DISEASE: ICD-10-CM

## 2024-12-17 DIAGNOSIS — M79.89 LEG SWELLING: ICD-10-CM
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
## 2024-12-17 DIAGNOSIS — E11.59 TYPE 2 DIABETES MELLITUS WITH OTHER CIRCULATORY COMPLICATIONS: ICD-10-CM

## 2024-12-17 DIAGNOSIS — E78.2 MIXED HYPERLIPIDEMIA: ICD-10-CM

## 2024-12-17 DIAGNOSIS — I10 ESSENTIAL HYPERTENSION: ICD-10-CM

## 2024-12-17 DIAGNOSIS — N32.81 OVERACTIVE BLADDER: ICD-10-CM

## 2024-12-17 PROCEDURE — 99214 OFFICE O/P EST MOD 30 MIN: CPT | Mod: S$PBB,,, | Performed by: INTERNAL MEDICINE

## 2024-12-17 PROCEDURE — 99999 PR PBB SHADOW E&M-EST. PATIENT-LVL III: CPT | Mod: PBBFAC,,, | Performed by: INTERNAL MEDICINE

## 2024-12-17 PROCEDURE — 99213 OFFICE O/P EST LOW 20 MIN: CPT | Mod: PBBFAC | Performed by: INTERNAL MEDICINE

## 2024-12-17 RX ORDER — SOLIFENACIN SUCCINATE 10 MG/1
10 TABLET, FILM COATED ORAL DAILY
Qty: 30 TABLET | Refills: 11 | Status: SHIPPED | OUTPATIENT
Start: 2024-12-17 | End: 2025-12-17

## 2024-12-17 RX ORDER — LEVOTHYROXINE SODIUM 100 UG/1
100 TABLET ORAL DAILY
Qty: 90 TABLET | Refills: 3 | Status: SHIPPED | OUTPATIENT
Start: 2024-12-17

## 2024-12-17 RX ORDER — LISINOPRIL 5 MG/1
5 TABLET ORAL DAILY
Qty: 90 TABLET | Refills: 3 | Status: SHIPPED | OUTPATIENT
Start: 2024-12-17 | End: 2025-12-17

## 2024-12-17 RX ORDER — FUROSEMIDE 20 MG/1
20 TABLET ORAL DAILY PRN
Qty: 30 TABLET | Refills: 1 | Status: SHIPPED | OUTPATIENT
Start: 2024-12-17

## 2024-12-17 RX ORDER — ALBUTEROL SULFATE 90 UG/1
1 INHALANT RESPIRATORY (INHALATION) EVERY 6 HOURS PRN
Qty: 18 G | Refills: 5 | Status: SHIPPED | OUTPATIENT
Start: 2024-12-17

## 2024-12-17 NOTE — PROGRESS NOTES
[facility]    Chief Complaint      Chief Complaint   Patient presents with    Follow-up     History of Present Illness      Pradip Zelaya is a 89 y.o. female who presents today for follow up.  Patient comes to appointment with child.    Fell after tripping on rug on 11/29/24, seen in [facility].  Still with some bruising on face.  CT head/Cspine unremarkable.  Stitches removed by NP.  Doing PT exercises at home. No falls since.    Problem List Items Addressed This Visit       Essential hypertension    Current Assessment & Plan     BP on Lisinopril 5 mg. No SOB/CP/HA.           Relevant Medications    lisinopriL (PRINIVIL,ZESTRIL) 5 MG tablet    Hypertensive kidney disease with stage 3a chronic kidney disease    Current Assessment & Plan     Labs stable in 3/2024, GFR 46, Cr 1.1.         Hypothyroidism    Current Assessment & Plan     Stable on synthroid 100 mcg daily, no S/S of hypo/hyperthyroidism. TSH elevated with normal T4 on last labs.         Relevant Medications    levothyroxine (SYNTHROID) 100 MCG tablet    Other Relevant Orders    TSH    T4, FREE    Mild chronic obstructive pulmonary disease - Primary    Current Assessment & Plan     Stable with albuterol inhaler (1-2 times per month) and nebs PRN. Breo caused hoarseness in past.  Does well.  No SOB/wheezing.           Relevant Medications    albuterol (PROVENTIL/VENTOLIN HFA) 90 mcg/actuation inhaler    Mixed hyperlipidemia    Current Assessment & Plan     Not on Rx meds, takes niacin.    The ASCVD Risk score (Francine MACKAY, et al., 2019) failed to calculate for the following reasons:    The 2019 ASCVD risk score is only valid for ages 40 to 79           Overactive bladder    Current Assessment & Plan     Stable on ditropan 10 mg daily.  Works well for her.  Gets up twice at night to use the restroom.         Type 2 diabetes mellitus with other circulatory complications    Current Assessment & Plan     A1C 6.9 in 3/2024. not on meds. Diet  controlled.         Relevant Orders    Hemoglobin A1C    Comprehensive Metabolic Panel    Lipid Panel     Other Visit Diagnoses       Fall, subsequent encounter        Leg swelling        Relevant Medications    furosemide (LASIX) 20 MG tablet                          Health Maintenance   Topic Date Due    TETANUS VACCINE  Never done    Shingles Vaccine (2 of 3) 2016    Eye Exam  05/15/2019    COVID-19 Vaccine ( season) 2024    Hemoglobin A1c  2024    Diabetes Urine Screening  2025    Lipid Panel  2025    Influenza Vaccine  Completed    RSV Vaccine (Age 60+ and Pregnant patients)  Completed    Pneumococcal Vaccines (Age 65+)  Completed       Past Medical History:   Diagnosis Date    Senile osteoporosis 2020       Past Surgical History:   Procedure Laterality Date    ADENOIDECTOMY      EYE SURGERY      TONSILLECTOMY         family history includes Cancer in her son; Hearing loss in her father and paternal grandfather; Heart disease in her mother; Hypertension in her father; Stroke in her father.    Social History     Tobacco Use    Smoking status: Former     Current packs/day: 0.00     Average packs/day: 0.3 packs/day for 10.0 years (2.5 ttl pk-yrs)     Types: Cigarettes     Start date: 1943     Quit date: 1953     Years since quittin.6    Smokeless tobacco: Never    Tobacco comments:     Extremely light usage.   Substance Use Topics    Alcohol use: Not Currently    Drug use: Never       Review of Systems   Constitutional:  Negative for chills and fever.   HENT:  Positive for hearing loss.    Respiratory:  Negative for cough and shortness of breath.    Cardiovascular:  Negative for chest pain and palpitations.   Gastrointestinal:  Negative for constipation, diarrhea, nausea and vomiting.   Genitourinary:  Negative for dysuria and hematuria.   Musculoskeletal:  Negative for falls.   Neurological:  Negative for headaches.        Outpatient Encounter  Medications as of 12/17/2024   Medication Sig Dispense Refill    ascorbic acid, vitamin C, (VITAMIN C) 500 MG tablet Take 500 mg by mouth once daily.      aspirin-calcium carbonate 81 mg-300 mg calcium(777 mg) Tab Take 81 mg by mouth once daily.      coenzyme Q10 100 mg capsule Take 100 mg by mouth once daily.      folic acid/multivit-min/lutein (CENTRUM SILVER ORAL) Take 1 capsule by mouth once daily.      glucosamine-chondroitin 500-400 mg tablet Take 1 tablet by mouth once daily.      niacin 500 MG CpSR Take 250 mg by mouth every evening.      potassium chloride SA (K-DUR,KLOR-CON M) 10 MEQ tablet Take 10 mEq by mouth 2 (two) times daily.      [DISCONTINUED] albuterol (PROVENTIL/VENTOLIN HFA) 90 mcg/actuation inhaler INHALE TWO PUFFS into THE lungs EVERY 6 HOURS AS NEEDED FOR WHEEZING. rescue 18 g 5    [DISCONTINUED] furosemide (LASIX) 20 MG tablet TAKE ONE TABLET BY MOUTH ONCE DAILY AS NEEDED FOR leg swelling 30 tablet 1    [DISCONTINUED] levothyroxine (SYNTHROID) 100 MCG tablet Take 1 tablet (100 mcg total) by mouth once daily. 90 tablet 5    [DISCONTINUED] lisinopriL (PRINIVIL,ZESTRIL) 5 MG tablet Take 1 tablet (5 mg total) by mouth once daily. 90 tablet 3    [DISCONTINUED] solifenacin (VESICARE) 10 MG tablet Take 1 tablet (10 mg total) by mouth once daily. 30 tablet 11    albuterol (PROVENTIL/VENTOLIN HFA) 90 mcg/actuation inhaler Inhale 1 puff into the lungs every 6 (six) hours as needed for Wheezing. Rescue 18 g 5    furosemide (LASIX) 20 MG tablet Take 1 tablet (20 mg total) by mouth daily as needed (leg swelling). 30 tablet 1    levothyroxine (SYNTHROID) 100 MCG tablet Take 1 tablet (100 mcg total) by mouth once daily. 90 tablet 3    lisinopriL (PRINIVIL,ZESTRIL) 5 MG tablet Take 1 tablet (5 mg total) by mouth once daily. 90 tablet 3    solifenacin (VESICARE) 10 MG tablet Take 1 tablet (10 mg total) by mouth once daily. 30 tablet 11     No facility-administered encounter medications on file as of  "12/17/2024.        Review of patient's allergies indicates:   Allergen Reactions    Bactrim [sulfamethoxazole-trimethoprim]     Ciprofloxacin     Prednisone     Sulfa (sulfonamide antibiotics) Hives    Mupirocin Rash       Physical Exam      Vital Signs  Pulse: 86  SpO2: 95 %  BP: 122/70  Pain Score:   2  Pain Loc: Leg  Height and Weight  Height: 5' 1" (154.9 cm)  Weight: 66.4 kg (146 lb 6.2 oz)  BSA (Calculated - sq m): 1.69 sq meters  BMI (Calculated): 27.7  Weight in (lb) to have BMI = 25: 132]    Physical Exam  Constitutional:       Appearance: She is well-developed.   HENT:      Head: Normocephalic and atraumatic.   Cardiovascular:      Rate and Rhythm: Normal rate and regular rhythm.      Heart sounds: Normal heart sounds. No murmur heard.  Pulmonary:      Effort: Pulmonary effort is normal. No respiratory distress.      Breath sounds: Normal breath sounds.   Abdominal:      General: There is no distension.      Palpations: Abdomen is soft.      Tenderness: There is no abdominal tenderness. There is no guarding.   Skin:     General: Skin is warm and dry.   Neurological:      Mental Status: She is alert. Mental status is at baseline.   Psychiatric:         Behavior: Behavior normal.          Laboratory:  CBC:  Recent Labs   Lab Result Units 10/20/24  0521 10/21/24  0801   WBC 103/uL 6.2 5.3   Hemoglobin gm/dL 13.1 13.2   Hematocrit % 38.7 39.2   MCV fL 94.0 93.2   MCH pg 31.7 31.4   MCHC g/dL 33.7 33.7     CMP:  Recent Labs   Lab Result Units 10/19/24  1040   Potassium mmol/L 4.2   AST U/L 28   Total Bilirubin mg/dL 0.5         URINALYSIS:  No results for input(s): "COLORU", "CLARITYU", "SPECGRAV", "PHUR", "PROTEINUA", "GLUCOSEU", "BILIRUBINCON", "BLOODU", "WBCU", "RBCU", "BACTERIA", "MUCUS", "NITRITE", "LEUKOCYTESUR", "UROBILINOGEN", "HYALINECASTS" in the last 2160 hours.   LIPIDS:  Recent Labs   Lab Result Units 10/19/24  0918   TSH uIU/mL 5.16*         TSH:  Recent Labs   Lab Result Units 10/19/24  0918 " "  TSH uIU/mL 5.16*         A1C:  No results for input(s): "HGBA1C" in the last 2160 hours.        Radiology:  No results found in the last 30 days.     Assessment/Plan     Pradip Zelaya is a 89 y.o.female with:    1. Mild chronic obstructive pulmonary disease  - albuterol (PROVENTIL/VENTOLIN HFA) 90 mcg/actuation inhaler; Inhale 1 puff into the lungs every 6 (six) hours as needed for Wheezing. Rescue  Dispense: 18 g; Refill: 5    2. Mixed hyperlipidemia    3. Essential hypertension  - lisinopriL (PRINIVIL,ZESTRIL) 5 MG tablet; Take 1 tablet (5 mg total) by mouth once daily.  Dispense: 90 tablet; Refill: 3    4. Overactive bladder    5. Hypertensive kidney disease with stage 3a chronic kidney disease    6. Acquired hypothyroidism  - TSH; Future  - T4, FREE; Future  - levothyroxine (SYNTHROID) 100 MCG tablet; Take 1 tablet (100 mcg total) by mouth once daily.  Dispense: 90 tablet; Refill: 3    7. Type 2 diabetes mellitus with other circulatory complications  - Hemoglobin A1C; Future  - Comprehensive Metabolic Panel; Future  - Lipid Panel; Future    8. Fall, subsequent encounter    9. Leg swelling  - furosemide (LASIX) 20 MG tablet; Take 1 tablet (20 mg total) by mouth daily as needed (leg swelling).  Dispense: 30 tablet; Refill: 1          -Continue current medications and maintain follow up with specialists.    -Follow up in about 6 months (around 6/17/2025) for follow up of medical problems.       Herminia Patiño MD  [facility]        "

## 2024-12-18 ENCOUNTER — LAB VISIT (OUTPATIENT)
Dept: LAB | Facility: HOSPITAL | Age: 89
End: 2024-12-18
Attending: INTERNAL MEDICINE
Payer: MEDICARE

## 2024-12-18 DIAGNOSIS — E03.9 ACQUIRED HYPOTHYROIDISM: ICD-10-CM

## 2024-12-18 DIAGNOSIS — E11.59 TYPE 2 DIABETES MELLITUS WITH OTHER CIRCULATORY COMPLICATIONS: ICD-10-CM

## 2024-12-18 LAB
ALBUMIN SERPL BCP-MCNC: 3.9 G/DL (ref 3.5–5.2)
ALP SERPL-CCNC: 65 U/L (ref 40–150)
ALT SERPL W/O P-5'-P-CCNC: 10 U/L (ref 10–44)
ANION GAP SERPL CALC-SCNC: 13 MMOL/L (ref 8–16)
AST SERPL-CCNC: 22 U/L (ref 10–40)
BILIRUB SERPL-MCNC: 0.8 MG/DL (ref 0.1–1)
BUN SERPL-MCNC: 25 MG/DL (ref 10–30)
CALCIUM SERPL-MCNC: 10.2 MG/DL (ref 8.7–10.5)
CHLORIDE SERPL-SCNC: 100 MMOL/L (ref 95–110)
CHOLEST SERPL-MCNC: 208 MG/DL (ref 120–199)
CHOLEST/HDLC SERPL: 4.5 {RATIO} (ref 2–5)
CO2 SERPL-SCNC: 24 MMOL/L (ref 23–29)
CREAT SERPL-MCNC: 1.2 MG/DL (ref 0.5–1.4)
EST. GFR  (NO RACE VARIABLE): 41.2 ML/MIN/1.73 M^2
ESTIMATED AVG GLUCOSE: 128 MG/DL (ref 68–131)
GLUCOSE SERPL-MCNC: 120 MG/DL (ref 70–110)
HBA1C MFR BLD: 6.1 % (ref 4–5.6)
HDLC SERPL-MCNC: 46 MG/DL (ref 40–75)
HDLC SERPL: 22.1 % (ref 20–50)
LDLC SERPL CALC-MCNC: 127.2 MG/DL (ref 63–159)
NONHDLC SERPL-MCNC: 162 MG/DL
POTASSIUM SERPL-SCNC: 4.7 MMOL/L (ref 3.5–5.1)
PROT SERPL-MCNC: 7.1 G/DL (ref 6–8.4)
SODIUM SERPL-SCNC: 137 MMOL/L (ref 136–145)
T4 FREE SERPL-MCNC: 1.18 NG/DL (ref 0.71–1.51)
TRIGL SERPL-MCNC: 174 MG/DL (ref 30–150)
TSH SERPL DL<=0.005 MIU/L-ACNC: 5.78 UIU/ML (ref 0.4–4)

## 2024-12-18 PROCEDURE — 84443 ASSAY THYROID STIM HORMONE: CPT | Performed by: INTERNAL MEDICINE

## 2024-12-18 PROCEDURE — 36415 COLL VENOUS BLD VENIPUNCTURE: CPT | Performed by: INTERNAL MEDICINE

## 2024-12-18 PROCEDURE — 80061 LIPID PANEL: CPT | Performed by: INTERNAL MEDICINE

## 2024-12-18 PROCEDURE — 83036 HEMOGLOBIN GLYCOSYLATED A1C: CPT | Performed by: INTERNAL MEDICINE

## 2024-12-18 PROCEDURE — 84439 ASSAY OF FREE THYROXINE: CPT | Performed by: INTERNAL MEDICINE

## 2024-12-18 PROCEDURE — 80053 COMPREHEN METABOLIC PANEL: CPT | Performed by: INTERNAL MEDICINE

## 2024-12-19 ENCOUNTER — EXTERNAL HOME HEALTH (OUTPATIENT)
Dept: HOME HEALTH SERVICES | Facility: HOSPITAL | Age: 89
End: 2024-12-19
Payer: MEDICARE

## 2025-01-14 DIAGNOSIS — Z00.00 ENCOUNTER FOR MEDICARE ANNUAL WELLNESS EXAM: ICD-10-CM

## 2025-06-10 ENCOUNTER — OFFICE VISIT (OUTPATIENT)
Dept: PRIMARY CARE CLINIC | Facility: CLINIC | Age: OVER 89
End: 2025-06-10
Payer: MEDICARE

## 2025-06-10 ENCOUNTER — LAB VISIT (OUTPATIENT)
Dept: LAB | Facility: HOSPITAL | Age: OVER 89
End: 2025-06-10
Attending: INTERNAL MEDICINE
Payer: MEDICARE

## 2025-06-10 VITALS
OXYGEN SATURATION: 95 % | HEIGHT: 61 IN | SYSTOLIC BLOOD PRESSURE: 134 MMHG | DIASTOLIC BLOOD PRESSURE: 72 MMHG | BODY MASS INDEX: 27.66 KG/M2 | HEART RATE: 65 BPM

## 2025-06-10 DIAGNOSIS — N18.31 HYPERTENSIVE KIDNEY DISEASE WITH STAGE 3A CHRONIC KIDNEY DISEASE: ICD-10-CM

## 2025-06-10 DIAGNOSIS — E78.2 MIXED HYPERLIPIDEMIA: ICD-10-CM

## 2025-06-10 DIAGNOSIS — I12.9 HYPERTENSIVE KIDNEY DISEASE WITH STAGE 3A CHRONIC KIDNEY DISEASE: ICD-10-CM

## 2025-06-10 DIAGNOSIS — I70.0 ATHEROSCLEROSIS OF AORTA: ICD-10-CM

## 2025-06-10 DIAGNOSIS — E03.9 ACQUIRED HYPOTHYROIDISM: ICD-10-CM

## 2025-06-10 DIAGNOSIS — G63 POLYNEUROPATHY IN DISEASES CLASSIFIED ELSEWHERE: ICD-10-CM

## 2025-06-10 DIAGNOSIS — J44.9 MILD CHRONIC OBSTRUCTIVE PULMONARY DISEASE: ICD-10-CM

## 2025-06-10 DIAGNOSIS — N32.81 OVERACTIVE BLADDER: ICD-10-CM

## 2025-06-10 DIAGNOSIS — R54 FRAIL ELDERLY: ICD-10-CM

## 2025-06-10 DIAGNOSIS — E11.59 TYPE 2 DIABETES MELLITUS WITH OTHER CIRCULATORY COMPLICATIONS: ICD-10-CM

## 2025-06-10 DIAGNOSIS — D32.9 MENINGIOMA: ICD-10-CM

## 2025-06-10 DIAGNOSIS — I10 ESSENTIAL HYPERTENSION: Primary | ICD-10-CM

## 2025-06-10 LAB
ALBUMIN SERPL BCP-MCNC: 3.7 G/DL (ref 3.5–5.2)
ALP SERPL-CCNC: 63 UNIT/L (ref 40–150)
ALT SERPL W/O P-5'-P-CCNC: 12 UNIT/L (ref 10–44)
ANION GAP (OHS): 9 MMOL/L (ref 8–16)
AST SERPL-CCNC: 20 UNIT/L (ref 11–45)
BILIRUB SERPL-MCNC: 0.6 MG/DL (ref 0.1–1)
BUN SERPL-MCNC: 22 MG/DL (ref 10–30)
CALCIUM SERPL-MCNC: 9.4 MG/DL (ref 8.7–10.5)
CHLORIDE SERPL-SCNC: 98 MMOL/L (ref 95–110)
CO2 SERPL-SCNC: 26 MMOL/L (ref 23–29)
CREAT SERPL-MCNC: 1.1 MG/DL (ref 0.5–1.4)
EAG (OHS): 128 MG/DL (ref 68–131)
GFR SERPLBLD CREATININE-BSD FMLA CKD-EPI: 46 ML/MIN/1.73/M2
GLUCOSE SERPL-MCNC: 114 MG/DL (ref 70–110)
HBA1C MFR BLD: 6.1 % (ref 4–5.6)
POTASSIUM SERPL-SCNC: 4.8 MMOL/L (ref 3.5–5.1)
PROT SERPL-MCNC: 6.8 GM/DL (ref 6–8.4)
SODIUM SERPL-SCNC: 133 MMOL/L (ref 136–145)
T4 FREE SERPL-MCNC: 1.27 NG/DL (ref 0.71–1.51)
TSH SERPL-ACNC: 4.84 UIU/ML (ref 0.4–4)

## 2025-06-10 PROCEDURE — 99213 OFFICE O/P EST LOW 20 MIN: CPT | Mod: PBBFAC | Performed by: INTERNAL MEDICINE

## 2025-06-10 PROCEDURE — 83036 HEMOGLOBIN GLYCOSYLATED A1C: CPT

## 2025-06-10 PROCEDURE — 80053 COMPREHEN METABOLIC PANEL: CPT

## 2025-06-10 PROCEDURE — 84439 ASSAY OF FREE THYROXINE: CPT

## 2025-06-10 PROCEDURE — 36415 COLL VENOUS BLD VENIPUNCTURE: CPT

## 2025-06-10 PROCEDURE — 84443 ASSAY THYROID STIM HORMONE: CPT

## 2025-06-10 PROCEDURE — 99999 PR PBB SHADOW E&M-EST. PATIENT-LVL III: CPT | Mod: PBBFAC,,, | Performed by: INTERNAL MEDICINE

## 2025-06-10 NOTE — PROGRESS NOTES
[facility]    Chief Complaint      Chief Complaint   Patient presents with    Follow-up     History of Present Illness      Pradip Zelaya is a 89 y.o. female who presents today for follow up.  Patient comes to appointment with child.    No falls since last visit.    Problem List Items Addressed This Visit       Atherosclerosis of aorta    Current Assessment & Plan   On ASA.         Essential hypertension - Primary    Current Assessment & Plan   BP on Lisinopril 5 mg. No SOB/CP/HA.           Frail elderly    Current Assessment & Plan   In WC for visit.         Hypertensive kidney disease with stage 3a chronic kidney disease    Current Assessment & Plan   Labs stable in 3/2024, GFR 46, Cr 1.1.         Relevant Orders    Comprehensive Metabolic Panel    Hypothyroidism    Current Assessment & Plan   Stable on synthroid 100 mcg daily, no S/S of hypo/hyperthyroidism.          Relevant Orders    TSH    T4, Free    Meningioma    Mild chronic obstructive pulmonary disease    Current Assessment & Plan   Stable with albuterol inhaler (1-2 times per month) and nebs PRN. Breo caused hoarseness in past.  Does well.  No SOB/wheezing.           Mixed hyperlipidemia    Current Assessment & Plan   Not on Rx meds, takes niacin.    The ASCVD Risk score (Francine DK, et al., 2019) failed to calculate for the following reasons:    The 2019 ASCVD risk score is only valid for ages 40 to 79           Overactive bladder    Current Assessment & Plan   Stable on vesicare 10 mg.  Works well for her.  Gets up twice at night to use the restroom.         Polyneuropathy in diseases classified elsewhere    Current Assessment & Plan   Stable.         Type 2 diabetes mellitus with other circulatory complications    Current Assessment & Plan   A1C 6.1 in 12/2024. not on meds. Diet controlled.         Relevant Orders    Hemoglobin A1C                       Health Maintenance   Topic Date Due    TETANUS VACCINE  Never done     Shingles Vaccine (2 of 3) 2016    Diabetic Eye Exam  05/15/2019    COVID-19 Vaccine ( season) 2024    Diabetes Urine Screening  2025    Hemoglobin A1c  2025    Lipid Panel  2025    Influenza Vaccine  Completed    RSV Vaccine (Age 60+ and Pregnant patients)  Completed    Pneumococcal Vaccines (Age 50+)  Completed       Past Medical History:   Diagnosis Date    Senile osteoporosis 2020       Past Surgical History:   Procedure Laterality Date    ADENOIDECTOMY      EYE SURGERY      TONSILLECTOMY         family history includes Cancer in her son; Hearing loss in her father and paternal grandfather; Heart disease in her mother; Hypertension in her father; Stroke in her father.    Social History     Tobacco Use    Smoking status: Former     Current packs/day: 0.00     Average packs/day: 0.3 packs/day for 10.0 years (2.5 ttl pk-yrs)     Types: Cigarettes     Start date: 1943     Quit date: 1953     Years since quittin.1    Smokeless tobacco: Never    Tobacco comments:     Extremely light usage.   Substance Use Topics    Alcohol use: Not Currently    Drug use: Never       Review of Systems   Constitutional:  Negative for chills and fever.   HENT:  Positive for hearing loss.    Respiratory:  Negative for cough and shortness of breath.    Cardiovascular:  Negative for chest pain and palpitations.   Gastrointestinal:  Negative for constipation, diarrhea, nausea and vomiting.   Genitourinary:  Negative for dysuria and hematuria.   Musculoskeletal:  Negative for falls.   Neurological:  Negative for headaches.        Outpatient Encounter Medications as of 6/10/2025   Medication Sig Dispense Refill    albuterol (PROVENTIL/VENTOLIN HFA) 90 mcg/actuation inhaler Inhale 1 puff into the lungs every 6 (six) hours as needed for Wheezing. Rescue 18 g 5    ascorbic acid, vitamin C, (VITAMIN C) 500 MG tablet Take 500 mg by mouth once daily.      aspirin-calcium carbonate 81  "mg-300 mg calcium(777 mg) Tab Take 81 mg by mouth once daily.      coenzyme Q10 100 mg capsule Take 100 mg by mouth once daily.      folic acid/multivit-min/lutein (CENTRUM SILVER ORAL) Take 1 capsule by mouth once daily.      furosemide (LASIX) 20 MG tablet Take 1 tablet (20 mg total) by mouth daily as needed (leg swelling). 30 tablet 1    glucosamine-chondroitin 500-400 mg tablet Take 1 tablet by mouth once daily.      levothyroxine (SYNTHROID) 100 MCG tablet Take 1 tablet (100 mcg total) by mouth once daily. 90 tablet 3    lisinopriL (PRINIVIL,ZESTRIL) 5 MG tablet Take 1 tablet (5 mg total) by mouth once daily. 90 tablet 3    niacin 500 MG CpSR Take 250 mg by mouth every evening.      potassium chloride SA (K-DUR,KLOR-CON M) 10 MEQ tablet Take 10 mEq by mouth 2 (two) times daily.      solifenacin (VESICARE) 10 MG tablet Take 1 tablet (10 mg total) by mouth once daily. 30 tablet 11     No facility-administered encounter medications on file as of 6/10/2025.        Review of patient's allergies indicates:   Allergen Reactions    Bactrim [sulfamethoxazole-trimethoprim]     Ciprofloxacin     Prednisone     Sulfa (sulfonamide antibiotics) Hives    Mupirocin Rash       Physical Exam      Vital Signs  Pulse: 65  SpO2: 95 %  BP: 134/72  Pain Score: 0-No pain  Height and Weight  Height: 5' 1" (154.9 cm)  Weight in (lb) to have BMI = 25: 132]    Physical Exam  Constitutional:       Appearance: She is well-developed.   HENT:      Head: Normocephalic and atraumatic.   Cardiovascular:      Rate and Rhythm: Normal rate and regular rhythm.      Heart sounds: Normal heart sounds. No murmur heard.  Pulmonary:      Effort: Pulmonary effort is normal. No respiratory distress.      Breath sounds: Normal breath sounds.   Abdominal:      General: There is no distension.      Palpations: Abdomen is soft.      Tenderness: There is no abdominal tenderness. There is no guarding.   Skin:     General: Skin is warm and dry.   Neurological:    " "  Mental Status: She is alert. Mental status is at baseline.   Psychiatric:         Behavior: Behavior normal.          Laboratory:  CBC:  No results for input(s): "WBC", "RBC", "HGB", "HCT", "PLT", "MCV", "MCH", "MCHC" in the last 2160 hours.    CMP:  No results for input(s): "GLU", "CALCIUM", "ALBUMIN", "PROT", "NA", "K", "CO2", "CL", "BUN", "ALKPHOS", "ALT", "AST", "BILITOT" in the last 2160 hours.    Invalid input(s): "CREATININ"        URINALYSIS:  No results for input(s): "COLORU", "CLARITYU", "SPECGRAV", "PHUR", "PROTEINUA", "GLUCOSEU", "BILIRUBINCON", "BLOODU", "WBCU", "RBCU", "BACTERIA", "MUCUS", "NITRITE", "LEUKOCYTESUR", "UROBILINOGEN", "HYALINECASTS" in the last 2160 hours.   LIPIDS:  No results for input(s): "TSH", "HDL", "CHOL", "TRIG", "LDLCALC", "CHOLHDL", "NONHDLCHOL", "TOTALCHOLEST" in the last 2160 hours.        TSH:  No results for input(s): "TSH" in the last 2160 hours.        A1C:  No results for input(s): "HGBA1C" in the last 2160 hours.        Radiology:  No results found in the last 30 days.     Assessment/Plan     Pradip Zelaya is a 89 y.o.female with:    1. Essential hypertension    2. Atherosclerosis of aorta    3. Mild chronic obstructive pulmonary disease    4. Mixed hyperlipidemia    5. Hypertensive kidney disease with stage 3a chronic kidney disease  - Comprehensive Metabolic Panel; Future    6. Overactive bladder    7. Acquired hypothyroidism  - TSH; Future  - T4, Free; Future    8. Type 2 diabetes mellitus with other circulatory complications  - Hemoglobin A1C; Future    9. Frail elderly    10. Meningioma    11. Polyneuropathy in diseases classified elsewhere            -Continue current medications and maintain follow up with specialists.    -Follow up in about 6 months (around 12/10/2025) for follow up of medical problems.       Herminia Patiño MD  [facility]"

## 2025-06-11 ENCOUNTER — RESULTS FOLLOW-UP (OUTPATIENT)
Dept: PRIMARY CARE CLINIC | Facility: CLINIC | Age: OVER 89
End: 2025-06-11

## 2025-08-27 ENCOUNTER — TELEPHONE (OUTPATIENT)
Dept: PRIMARY CARE CLINIC | Facility: CLINIC | Age: OVER 89
End: 2025-08-27
Payer: MEDICARE

## 2025-08-29 ENCOUNTER — OFFICE VISIT (OUTPATIENT)
Dept: PRIMARY CARE CLINIC | Facility: CLINIC | Age: OVER 89
End: 2025-08-29
Payer: MEDICARE

## 2025-08-29 VITALS
HEART RATE: 77 BPM | SYSTOLIC BLOOD PRESSURE: 120 MMHG | HEIGHT: 61 IN | BODY MASS INDEX: 27.66 KG/M2 | OXYGEN SATURATION: 97 % | DIASTOLIC BLOOD PRESSURE: 82 MMHG

## 2025-08-29 DIAGNOSIS — R55 SYNCOPE, UNSPECIFIED SYNCOPE TYPE: Primary | ICD-10-CM

## 2025-08-29 DIAGNOSIS — I10 ESSENTIAL HYPERTENSION: ICD-10-CM

## 2025-08-29 DIAGNOSIS — N17.9 ACUTE KIDNEY INJURY: ICD-10-CM

## 2025-08-29 DIAGNOSIS — E86.0 DEHYDRATION: ICD-10-CM

## 2025-08-29 DIAGNOSIS — R54 FRAIL ELDERLY: ICD-10-CM

## 2025-08-29 PROCEDURE — 99213 OFFICE O/P EST LOW 20 MIN: CPT | Mod: PBBFAC | Performed by: INTERNAL MEDICINE

## 2025-08-29 PROCEDURE — 99999 PR PBB SHADOW E&M-EST. PATIENT-LVL III: CPT | Mod: PBBFAC,,, | Performed by: INTERNAL MEDICINE

## 2025-08-29 RX ORDER — QUETIAPINE FUMARATE 25 MG/1
25 TABLET, FILM COATED ORAL 2 TIMES DAILY
Qty: 60 TABLET | Refills: 11 | Status: SHIPPED | OUTPATIENT
Start: 2025-08-29 | End: 2026-08-29

## 2025-08-29 RX ORDER — AMLODIPINE BESYLATE 5 MG/1
5 TABLET ORAL DAILY
COMMUNITY
Start: 2025-08-27 | End: 2026-08-27